# Patient Record
Sex: FEMALE | Race: WHITE | NOT HISPANIC OR LATINO | Employment: FULL TIME | URBAN - METROPOLITAN AREA
[De-identification: names, ages, dates, MRNs, and addresses within clinical notes are randomized per-mention and may not be internally consistent; named-entity substitution may affect disease eponyms.]

---

## 2019-08-09 ENCOUNTER — HOSPITAL ENCOUNTER (OUTPATIENT)
Facility: CLINIC | Age: 34
Discharge: HOME | End: 2019-08-09
Attending: OBSTETRICS & GYNECOLOGY | Admitting: OBSTETRICS & GYNECOLOGY
Payer: COMMERCIAL

## 2019-08-09 VITALS — RESPIRATION RATE: 16 BRPM | SYSTOLIC BLOOD PRESSURE: 104 MMHG | TEMPERATURE: 97.9 F | DIASTOLIC BLOOD PRESSURE: 64 MMHG

## 2019-08-09 PROCEDURE — 59025 FETAL NON-STRESS TEST: CPT

## 2019-08-09 PROCEDURE — 59015 CHORION BIOPSY: CPT

## 2019-08-09 PROCEDURE — G0463 HOSPITAL OUTPT CLINIC VISIT: HCPCS | Mod: 25

## 2019-08-09 RX ORDER — ONDANSETRON 2 MG/ML
4 INJECTION INTRAMUSCULAR; INTRAVENOUS EVERY 6 HOURS PRN
Status: DISCONTINUED | OUTPATIENT
Start: 2019-08-09 | End: 2019-08-09 | Stop reason: HOSPADM

## 2019-08-09 RX ORDER — LEVOTHYROXINE SODIUM 75 UG/1
75 TABLET ORAL DAILY
COMMUNITY
End: 2023-08-30

## 2019-08-09 NOTE — DISCHARGE INSTRUCTIONS
Discharge Instruction for Undelivered Patients      You were seen for: blurred vision  We Consulted: Dr Bates  You had (Test or Medicine):Non stress test     Diet:   Drink 8 to 12 glasses of liquids (milk, juice, water) every day.  You may eat meals and snacks.     Activity:  Count fetal kicks everyday (see handout)  Call your doctor or nurse midwife if your baby is moving less than usual.     Call your provider if you notice:  Swelling in your face or increased swelling in your hands or legs.  Headaches that are not relieved by Tylenol (acetaminophen).  Changes in your vision (blurring: seeing spots or stars.)  Nausea (sick to your stomach) and vomiting (throwing up).   Weight gain of 5 pounds or more per week.  Heartburn that doesn't go away.  Signs of bladder infection: pain when you urinate (use the toilet), need to go more often and more urgently.  The bag of tomlinson (rupture of membranes) breaks, or you notice leaking in your underwear.  Bright red blood in your underwear.  Abdominal (lower belly) or stomach pain.  For first baby: Contractions (tightening) less than 5 minutes apart for one hour or more.  Second (plus) baby: Contractions (tightening) less than 10 minutes apart and getting stronger.  *If less than 34 weeks: Contractions (tightenings) more than 6 times in one hour.  Increase or change in vaginal discharge (note the color and amount)  Other:     Follow-up:  As scheduled in the clinic

## 2019-08-09 NOTE — PLAN OF CARE
Halle is ordinarily seen at [facility], but due to them being closed she came here. Dr Bates is on call MD for today. She reports having an episode of blurred vision once a day for the past few days. She states it is usually in her left eye and lasts for a few minutes then goes away.Denies headache or epigastric distress, and no swelling She has no medical issues. Monitors applied with consent and an NST was obtained per order from Dr Bates.   1415- Discharge patient to home. F/U with regular MD as scheduled in office. Both patient and  verbalize understanding of this plan.

## 2020-02-19 DIAGNOSIS — Z82.79 FAMILY HISTORY OF BICUSPID AORTIC VALVE: Primary | ICD-10-CM

## 2020-02-19 NOTE — PROGRESS NOTES
Date: 2/19/2020    Time of Call: 12:12 PM     Diagnosis:  Family history of BAV     [ TORB ] Ordering provider:  March  Order: echo complete to screen for BAV     Order received by: MARLA Ochoa     Follow-up/additional notes: daughter of Cliff Gonzalezyeni

## 2021-05-15 ENCOUNTER — HEALTH MAINTENANCE LETTER (OUTPATIENT)
Age: 36
End: 2021-05-15

## 2021-09-04 ENCOUNTER — HEALTH MAINTENANCE LETTER (OUTPATIENT)
Age: 36
End: 2021-09-04

## 2022-06-11 ENCOUNTER — HEALTH MAINTENANCE LETTER (OUTPATIENT)
Age: 37
End: 2022-06-11

## 2022-07-22 RX ORDER — PIMECROLIMUS 10 MG/G
CREAM TOPICAL
COMMUNITY
Start: 2022-01-18 | End: 2023-12-11

## 2022-07-22 RX ORDER — KETOCONAZOLE 20 MG/G
CREAM TOPICAL
COMMUNITY
Start: 2022-01-18 | End: 2023-12-11

## 2022-07-22 RX ORDER — TACROLIMUS 1 MG/G
OINTMENT TOPICAL
COMMUNITY
Start: 2021-11-05 | End: 2023-12-11

## 2022-07-22 RX ORDER — LEVOTHYROXINE SODIUM 112 UG/1
TABLET ORAL
COMMUNITY
Start: 2021-05-23

## 2022-08-01 ENCOUNTER — ANCILLARY PROCEDURE (OUTPATIENT)
Dept: GENERAL RADIOLOGY | Facility: CLINIC | Age: 37
End: 2022-08-01
Attending: NURSE PRACTITIONER
Payer: COMMERCIAL

## 2022-08-01 ENCOUNTER — OFFICE VISIT (OUTPATIENT)
Dept: RHEUMATOLOGY | Facility: CLINIC | Age: 37
End: 2022-08-01
Payer: COMMERCIAL

## 2022-08-01 VITALS
OXYGEN SATURATION: 100 % | DIASTOLIC BLOOD PRESSURE: 72 MMHG | TEMPERATURE: 97.4 F | WEIGHT: 136.9 LBS | SYSTOLIC BLOOD PRESSURE: 97 MMHG | HEART RATE: 55 BPM

## 2022-08-01 DIAGNOSIS — M05.79 RHEUMATOID ARTHRITIS INVOLVING MULTIPLE SITES WITH POSITIVE RHEUMATOID FACTOR (H): ICD-10-CM

## 2022-08-01 DIAGNOSIS — M05.79 RHEUMATOID ARTHRITIS INVOLVING MULTIPLE SITES WITH POSITIVE RHEUMATOID FACTOR (H): Primary | ICD-10-CM

## 2022-08-01 DIAGNOSIS — M35.01 SJOGREN'S SYNDROME WITH KERATOCONJUNCTIVITIS SICCA (H): ICD-10-CM

## 2022-08-01 PROBLEM — L80 VITILIGO: Status: ACTIVE | Noted: 2022-02-15

## 2022-08-01 PROBLEM — R76.8 RHEUMATOID FACTOR POSITIVE: Status: ACTIVE | Noted: 2022-07-11

## 2022-08-01 PROCEDURE — 73130 X-RAY EXAM OF HAND: CPT | Mod: GC | Performed by: RADIOLOGY

## 2022-08-01 PROCEDURE — 99204 OFFICE O/P NEW MOD 45 MIN: CPT | Performed by: NURSE PRACTITIONER

## 2022-08-01 RX ORDER — HYDROXYCHLOROQUINE SULFATE 200 MG/1
200 TABLET, FILM COATED ORAL 2 TIMES DAILY
Qty: 60 TABLET | Refills: 3 | Status: SHIPPED | OUTPATIENT
Start: 2022-08-01 | End: 2022-12-28

## 2022-08-01 ASSESSMENT — PAIN SCALES - GENERAL: PAINLEVEL: MODERATE PAIN (4)

## 2022-08-01 NOTE — PATIENT INSTRUCTIONS
1) Hand xrays today    2) Start plaquenil 200 mg tab, 1 tab twice a day    3) Return to see me in 3 months.

## 2022-08-01 NOTE — PROGRESS NOTES
Rheumatology Consult  Shanice Drew CNP      Halle Leonheld  YOB: 1985    Age: 37 year old   MRN# [id number]       Date of Visit: 08/01/2022  Primary care provider: Cindy Mack              Subjective:     Halle is a 37 year old female presents today for consult for early RA, (Onset 2/22, dx 5/22,+RF, +CCP, +MAEGAN).  Current therapy: None.      Today:     HPI: Started having sore hands 2/22 and she would wake up with quite sore hands, then her feet started doing the same thing. Felt like she was walking like her dad in am (who has RA).  Stiffness improves with moving after a couple of hours. Feels like fingers are tight full. No joint redness or warmth. Prior to onset had no illness or stressful event, she had quite BF 1 yr earlier.  Saw rheumatologist in May 2022 at [facility] and dx with early onset RA. Was unsure what to do with the options as she is actively trying to get pregnant.       ROS: Patient denies recent infections, fevers, JOHNSON, weight loss, diarrhea, SOB, mouth sores, new numbness or tingling. Has dry eye the last year and slight dry mouth, wakes up in the middle of the night to drink water. Had rash on abdomin and thought to be viral at the time 5/22. Denies chronic pain. Does get pain after eating avocado, nuts, quiona, veggies. Avoids gluten as camp up on a genetic screen.  LMP 2 weeks ago.         Past Rheum tx:      Social History  No tobacco or ETOH  Works interior design.     FMH:  Strong fmh RA    Active Problem list:  Patient Active Problem List    Diagnosis Date Noted     Indication for care in labor or delivery 08/09/2019     Priority: Medium            Objective:     Physical Exam  Blood Pressure 97/72 (BP Location: Left arm, Patient Position: Sitting, Cuff Size: Adult Regular)   Pulse 55   Temperature 97.4  F (36.3  C) (Oral)   Weight 62.1 kg (136 lb 14.4 oz)   Last Menstrual Period  (LMP Unknown)   Oxygen Saturation 100%   Breastfeeding No    There is no height or weight on file to calculate BMI.    Constitutional: Normal body habitus with out gross deformities. Well groomed.   Psych: nl judgement, orientation, memory, affect.  Eyes: wnl EOM, PERRLA, vision. conjunctiva injected.   ENT: wnl external ears, nose, hearing, lips, teeth, gums, throat. No mucous membrane lesions, normal saliva pool  Neck: no mass, thyroid enlargement, enlarged cervical lymph nodes or parotid glands  Resp: lungs clear to auscultation, nl work of breathing  CV: RRR, no murmurs, rubs or gallops, no edema  GI: +BS, abd non tender  Skin: no nail pitting, alopecia, rash, nodules or lesions of exposed areas of face, neck, bilateral upper and lower extremity   Neuro: Strength WNL of bilateral upper and lower extremity large muscle groups.     MSK- (TTP=tender to palpation, S=swelling, W=warmth, R=redness)  TMJ: -TTP/S/W/R ,FROM   Cervical spine:  FROM  Shoulders: -TTP/S/W/R ,FROM   Elbows: -TTP/S/W/R ,FROM   Wrists: -TTP/S/W/R ,FROM   Hands: R 3rd PIP, L 3rd MCP, L 2nd PIP + mild to trace swelling and TTP.   Hips: -TTP of GT  Knees: -TTP/S/W/R ,FROM    Ankles: -TTP/S/W/R ,FROM  No enthesopathy or tenosynovitis.   Feet: -TTP/S/W/R ,FROM  . No dactylitis. Fullness R 2-3 MTPs.       Labs:    At [facility]   +MAEGAN 1:160 fine dense speckled   +SSA  +CCP,  +RF  +quant gold ?,   C3 78 (low)   DsDNA normal.     Imaging: Chest xray at [facility], WNL.            Assessment and Plan:     1) Early onset RA, with secondary sjogren's (Onset 2/22, dx 5/22,+RF, +CCP, +MAEGAN, +SSA).  Current therapy: None.  Pt with 3 swollen joints on exam today. Is currently attempting to get pregnant and is unsure with starting medication during this time. Discussed + SSA and risk of fetal heart block, strongly recommend pt start plaquenil. Discussed pros and cons of more aggressive therapy. On exam RA appears early and currently mild, will do xray of hands to further evaluate. If xray is with out erosions, ok to move forward with  plaquenil as mono therapy in the setting of attempting pregnancy and +LTBI, if symptoms increase/ flares or has significant erosions on xray would consider imuran vs orencia.   RTC in 3 months.    2) + LTBI, + quant gold.   -Encouraged treatment, pt likely will do after family planning complete    Pt education provided on plaquenil SE, risks and benefits, need for yearly eye exam, as well as RA dz processPt states understanding and agreement to plan of care, has no further questions.     Shanice Drew, CNP  Rheumatology,  Health

## 2022-08-06 ENCOUNTER — TELEPHONE (OUTPATIENT)
Dept: RHEUMATOLOGY | Facility: CLINIC | Age: 37
End: 2022-08-06

## 2022-08-06 ENCOUNTER — NURSE TRIAGE (OUTPATIENT)
Dept: NURSING | Facility: CLINIC | Age: 37
End: 2022-08-06

## 2022-08-07 NOTE — CONFIDENTIAL NOTE
Telephone encounter:      Paged regarding patient with reaction after hydroxychloroquine.  Patient reports she took hydroxychloroquine for the first time this morning again this evening.  About an hour after taking the dose this evening she reports she had blurry vision that lasted about a half an hour, with associated headache.  She notes ongoing but improved headache and resolved blurry vision.  Patient reports history of migraines with aura.  Notes that headache and vision changes were similar to previous migraines with aura however rather than being in her peripheral vision was across her central field of vision.  She denies tingling, numbness or weakness in her extremities.    Discussed with patient that hydroxychloroquine can cause ophthalmologic issues with chronic use but that these are unlikely to occur acutely and that reported episode is unlikely related to hydroxychloroquine.  Recommended that she alert clinic should symptoms recur.    Ritika Mack MD  Rheumatology Fellow  8/6/2022

## 2022-08-07 NOTE — TELEPHONE ENCOUNTER
PCP: [facility]: Dr Cindy Mack     Prescribed Hydroxychloroquine earlier this week. She started taking it today and took it twice. She began getting blurry vision. It says on the pharmacy insert to call if blurry vision. Her vision is better now.   Rheumatologist Shanice Drew NP @ [facility] prescribed the medication.   Triaged to a disposition of Go to ED now or PCP triage. Advised to call Rheumatologist oncall now, if unable to reach, then she will either call oncall PCP now or go to the ER.     Krystin Bledsoe RN Triage Nurse Advisor 9:28 PM 8/6/2022    Reason for Disposition    [1] Blurred vision or visual changes AND [2] present now AND [3] sudden onset or new (e.g., minutes, hours, days)  (Exception: seeing floaters / black specks OR previously diagnosed migraine headaches with same symptoms)    Additional Information    Negative: Weakness of the face, arm or leg on one side of the body    Negative: Followed getting substance in the eye    Negative: Foreign body or object is or was lodged in the eye    Negative: Followed an eye injury    Negative: Followed sun lamp or sun exposure (UV keratitis)    Negative: Yellow or green discharge (pus) in the eye    Negative: Pregnant    Negative: Postpartum (from 0 to 6 weeks after delivery)    Negative: Complete loss of vision in 1 or both eyes    Negative: SEVERE eye pain    Negative: SEVERE headache    Negative: Double vision    Protocols used: VISION LOSS OR CHANGE-A-AH

## 2022-08-24 ENCOUNTER — TRANSFERRED RECORDS (OUTPATIENT)
Dept: HEALTH INFORMATION MANAGEMENT | Facility: CLINIC | Age: 37
End: 2022-08-24

## 2022-09-22 ENCOUNTER — TELEPHONE (OUTPATIENT)
Dept: RHEUMATOLOGY | Facility: CLINIC | Age: 37
End: 2022-09-22

## 2022-09-22 NOTE — TELEPHONE ENCOUNTER
Plaquenil eye exam abstracted into flow sheets. Office notes to be scanned into chart.      JASON Rudolph  Rheumatology/Infectious disease  [facility]   [phone]

## 2022-10-22 ENCOUNTER — HEALTH MAINTENANCE LETTER (OUTPATIENT)
Age: 37
End: 2022-10-22

## 2022-12-28 ENCOUNTER — OFFICE VISIT (OUTPATIENT)
Dept: RHEUMATOLOGY | Facility: CLINIC | Age: 37
End: 2022-12-28
Attending: NURSE PRACTITIONER
Payer: COMMERCIAL

## 2022-12-28 ENCOUNTER — LAB (OUTPATIENT)
Dept: LAB | Facility: CLINIC | Age: 37
End: 2022-12-28
Attending: NURSE PRACTITIONER
Payer: COMMERCIAL

## 2022-12-28 VITALS
WEIGHT: 138.5 LBS | BODY MASS INDEX: 21.74 KG/M2 | HEART RATE: 90 BPM | DIASTOLIC BLOOD PRESSURE: 70 MMHG | OXYGEN SATURATION: 97 % | TEMPERATURE: 98.6 F | HEIGHT: 67 IN | SYSTOLIC BLOOD PRESSURE: 101 MMHG

## 2022-12-28 DIAGNOSIS — R76.8 POSITIVE ANA (ANTINUCLEAR ANTIBODY): ICD-10-CM

## 2022-12-28 DIAGNOSIS — M05.79 RHEUMATOID ARTHRITIS INVOLVING MULTIPLE SITES WITH POSITIVE RHEUMATOID FACTOR (H): ICD-10-CM

## 2022-12-28 DIAGNOSIS — M35.01 SJOGREN'S SYNDROME WITH KERATOCONJUNCTIVITIS SICCA (H): ICD-10-CM

## 2022-12-28 DIAGNOSIS — R59.9 ADENOPATHY: ICD-10-CM

## 2022-12-28 DIAGNOSIS — M05.79 RHEUMATOID ARTHRITIS INVOLVING MULTIPLE SITES WITH POSITIVE RHEUMATOID FACTOR (H): Primary | ICD-10-CM

## 2022-12-28 DIAGNOSIS — E55.9 VITAMIN D DEFICIENCY: ICD-10-CM

## 2022-12-28 LAB
ALBUMIN SERPL-MCNC: 4 G/DL (ref 3.4–5)
ALBUMIN UR-MCNC: NEGATIVE MG/DL
ALT SERPL W P-5'-P-CCNC: 16 U/L (ref 0–50)
APPEARANCE UR: CLEAR
AST SERPL W P-5'-P-CCNC: 13 U/L (ref 0–45)
BASOPHILS # BLD AUTO: 0.1 10E3/UL (ref 0–0.2)
BASOPHILS NFR BLD AUTO: 1 %
BILIRUB UR QL STRIP: NEGATIVE
COLOR UR AUTO: NORMAL
CREAT SERPL-MCNC: 0.81 MG/DL (ref 0.52–1.04)
CRP SERPL-MCNC: <2.9 MG/L (ref 0–8)
DEPRECATED CALCIDIOL+CALCIFEROL SERPL-MC: 32 UG/L (ref 20–75)
EOSINOPHIL # BLD AUTO: 1.1 10E3/UL (ref 0–0.7)
EOSINOPHIL NFR BLD AUTO: 11 %
ERYTHROCYTE [DISTWIDTH] IN BLOOD BY AUTOMATED COUNT: 12.3 % (ref 10–15)
ERYTHROCYTE [SEDIMENTATION RATE] IN BLOOD BY WESTERGREN METHOD: 5 MM/HR (ref 0–20)
GFR SERPL CREATININE-BSD FRML MDRD: >90 ML/MIN/1.73M2
GLUCOSE UR STRIP-MCNC: NEGATIVE MG/DL
HCT VFR BLD AUTO: 43.2 % (ref 35–47)
HGB BLD-MCNC: 14.8 G/DL (ref 11.7–15.7)
HGB UR QL STRIP: NEGATIVE
IMM GRANULOCYTES # BLD: 0 10E3/UL
IMM GRANULOCYTES NFR BLD: 0 %
KETONES UR STRIP-MCNC: NEGATIVE MG/DL
LEUKOCYTE ESTERASE UR QL STRIP: NEGATIVE
LYMPHOCYTES # BLD AUTO: 1.7 10E3/UL (ref 0.8–5.3)
LYMPHOCYTES NFR BLD AUTO: 16 %
Lab: NORMAL
MCH RBC QN AUTO: 29.8 PG (ref 26.5–33)
MCHC RBC AUTO-ENTMCNC: 34.3 G/DL (ref 31.5–36.5)
MCV RBC AUTO: 87 FL (ref 78–100)
MONOCYTES # BLD AUTO: 0.8 10E3/UL (ref 0–1.3)
MONOCYTES NFR BLD AUTO: 8 %
NEUTROPHILS # BLD AUTO: 6.7 10E3/UL (ref 1.6–8.3)
NEUTROPHILS NFR BLD AUTO: 64 %
NITRATE UR QL: NEGATIVE
NRBC # BLD AUTO: 0 10E3/UL
NRBC BLD AUTO-RTO: 0 /100
PERFORMING LABORATORY: NORMAL
PH UR STRIP: 6.5 [PH] (ref 5–7)
PLATELET # BLD AUTO: 166 10E3/UL (ref 150–450)
RBC # BLD AUTO: 4.97 10E6/UL (ref 3.8–5.2)
RBC URINE: 0 /HPF
SP GR UR STRIP: 1 (ref 1–1.03)
SPECIMEN STATUS: NORMAL
TEST NAME: NORMAL
TOTAL PROTEIN SERUM FOR ELP: 7.2 G/DL (ref 6.4–8.3)
UROBILINOGEN UR STRIP-MCNC: NORMAL MG/DL
WBC # BLD AUTO: 10.4 10E3/UL (ref 4–11)
WBC URINE: <1 /HPF

## 2022-12-28 PROCEDURE — 82306 VITAMIN D 25 HYDROXY: CPT

## 2022-12-28 PROCEDURE — 36415 COLL VENOUS BLD VENIPUNCTURE: CPT

## 2022-12-28 PROCEDURE — 84165 PROTEIN E-PHORESIS SERUM: CPT | Mod: 26

## 2022-12-28 PROCEDURE — 86147 CARDIOLIPIN ANTIBODY EA IG: CPT

## 2022-12-28 PROCEDURE — G0463 HOSPITAL OUTPT CLINIC VISIT: HCPCS

## 2022-12-28 PROCEDURE — 84155 ASSAY OF PROTEIN SERUM: CPT

## 2022-12-28 PROCEDURE — 84460 ALANINE AMINO (ALT) (SGPT): CPT

## 2022-12-28 PROCEDURE — 86146 BETA-2 GLYCOPROTEIN ANTIBODY: CPT

## 2022-12-28 PROCEDURE — 86160 COMPLEMENT ANTIGEN: CPT

## 2022-12-28 PROCEDURE — 84999 UNLISTED CHEMISTRY PROCEDURE: CPT

## 2022-12-28 PROCEDURE — 99214 OFFICE O/P EST MOD 30 MIN: CPT | Performed by: NURSE PRACTITIONER

## 2022-12-28 PROCEDURE — 86140 C-REACTIVE PROTEIN: CPT

## 2022-12-28 PROCEDURE — 85025 COMPLETE CBC W/AUTO DIFF WBC: CPT | Performed by: NURSE PRACTITIONER

## 2022-12-28 PROCEDURE — 86225 DNA ANTIBODY NATIVE: CPT

## 2022-12-28 PROCEDURE — 84165 PROTEIN E-PHORESIS SERUM: CPT | Mod: TC | Performed by: PATHOLOGY

## 2022-12-28 PROCEDURE — 85730 THROMBOPLASTIN TIME PARTIAL: CPT

## 2022-12-28 PROCEDURE — 86256 FLUORESCENT ANTIBODY TITER: CPT

## 2022-12-28 PROCEDURE — 85390 FIBRINOLYSINS SCREEN I&R: CPT | Mod: 26 | Performed by: PATHOLOGY

## 2022-12-28 PROCEDURE — 81001 URINALYSIS AUTO W/SCOPE: CPT

## 2022-12-28 PROCEDURE — 82040 ASSAY OF SERUM ALBUMIN: CPT

## 2022-12-28 PROCEDURE — 82565 ASSAY OF CREATININE: CPT

## 2022-12-28 PROCEDURE — 84450 TRANSFERASE (AST) (SGOT): CPT

## 2022-12-28 PROCEDURE — 85652 RBC SED RATE AUTOMATED: CPT

## 2022-12-28 RX ORDER — HYDROXYCHLOROQUINE SULFATE 200 MG/1
200 TABLET, FILM COATED ORAL DAILY
Qty: 90 TABLET | Refills: 3 | Status: SHIPPED | OUTPATIENT
Start: 2022-12-28 | End: 2023-08-30

## 2022-12-28 ASSESSMENT — PAIN SCALES - GENERAL: PAINLEVEL: MILD PAIN (2)

## 2022-12-28 NOTE — NURSING NOTE
"Chief Complaint   Patient presents with     RECHECK     Follow-up     /70   Pulse 90   Temp 98.6  F (37  C)   Ht 1.702 m (5' 7\")   Wt 62.8 kg (138 lb 8 oz)   SpO2 97%   BMI 21.69 kg/m      Obdulia Coppola on 12/28/2022 at 9:54 AM    "

## 2022-12-28 NOTE — LETTER
12/28/2022       RE: Halle Alcantara  [address]  [facility] [address]     Dear Colleague,    Thank you for referring your patient, Halle Alcantara, to the [facility] RHEUMATOLOGY at [facility]. Please see a copy of my visit note below.        Rheumatology Consult  Shanice Drew CNP      Halle Alcantara  YOB: 1985    Age: 37 year old   MRN# [id number]       Date of Visit: 12/28/2022  Primary care provider: Cindy Mack              Subjective:     Halle is a 37 year old female presents today for consult for early RA, (Onset 2/22, dx 5/22,+RF, +CCP, +MAEGAN, + SSA, low c4).  Current therapy: None.      Today:   Tried plaquenil 200 mg BID for a month, felt blurry vision and light headed. Stopped taking it, the symptoms went away. During that period joint pain improved, then worsened with stopping plaquenil.  On plaquenil was super hard to work. Now last month or two joint symptoms are back. Stiff for an hour. Had a cold begnining of Dec, L lymph node has stayed swollen since.     HPI: Started having sore hands 2/22 and she would wake up with quite sore hands, then her feet started doing the same thing. Felt like she was walking like her dad in am (who has RA).  Stiffness improves with moving after a couple of hours. Feels like fingers are tight full. No joint redness or warmth. Prior to onset had no illness or stressful event, she had quite BF 1 yr earlier.  Saw rheumatologist in May 2022 at [facility] and dx with early onset RA. Was unsure what to do with the options as she is actively trying to get pregnant.       ROS: Patient denies recent infections, fevers, JOHNSON, weight loss, diarrhea, SOB, mouth sores, new numbness or tingling. Has dry eye the last year and slight dry mouth, wakes up in the middle of the night to drink water. Had rash on abdomin and thought to be viral at the time 5/22. Denies  "chronic pain. Does get pain after eating avocado, nuts, quiona, veggies. Avoids gluten as camp up on a genetic screen.  LMP 2 weeks ago.         Past Rheum tx:      Social History  No tobacco or ETOH  Works interior design.     FMH:  Strong fmh RA    Active Problem list:  Patient Active Problem List    Diagnosis Date Noted     Rheumatoid factor positive 07/11/2022     Priority: Medium     Vitiligo 02/15/2022     Priority: Medium     Indication for care in labor or delivery 08/09/2019     Priority: Medium     Iron deficiency anemia 04/23/2016     Priority: Medium     Depressive disorder, not elsewhere classified 04/23/2016     Priority: Medium     Hypothyroidism 07/26/2005     Priority: Medium     Formatting of this note might be different from the original.  Subacute thyroiditis  2005 then became hypothyroid.  Formatting of this note might be different from the original.  Formatting of this note might be different from the original.  Subacute thyroiditis  2005 then became hypothyroid.              Objective:     Physical Exam  Blood Pressure 101/70   Pulse 90   Temperature 98.6  F (37  C)   Height 1.702 m (5' 7\")   Weight 62.8 kg (138 lb 8 oz)   Oxygen Saturation 97%   Body Mass Index 21.69 kg/m    Body mass index is 21.69 kg/m .    Constitutional: Normal body habitus with out gross deformities. Well groomed.   Psych: nl judgement, orientation, memory, affect.  Eyes: wnl EOM, PERRLA, vision. conjunctiva injected.     Neck: L swollen bulky lymph node cervical     Skin: no nail pitting, alopecia, rash, nodules or lesions of exposed areas of face, neck, bilateral upper and lower extremity   Neuro: Strength WNL of bilateral upper and lower extremity large muscle groups.     MSK- (TTP=tender to palpation, S=swelling, W=warmth, R=redness)    Wrists: -TTP/S/W/R ,FROM   Hands: R 3rd PIP mild to trace swelling and TTP.     Knees: -TTP/S/W/R ,FROM        Labs:    At [facility]   +MAEGAN 1:160 fine dense speckled "   +SSA  +CCP,  +RF  +quant gold ?,   C3 78 (low)   DsDNA normal.   VITAMIN D TOTAL   Component 04/18/2016       VITAMIN D TOTAL 16.4 Low             Imaging: Chest xray at [facility], WNL.   3 views bilateral hand radiographs 8/1/2022 4:13 PM                                                                    Impression:  1. No acute osseous abnormality.  2. No substantial degenerative change.              Assessment and Plan:     1) Early onset RA, with secondary sjogren's (Onset 2/22, dx 5/22,+RF, +CCP, +MAEGAN, +SSA, low c4) possible lupus.  Current therapy: None.  Is currently contemplating/ attempting to get pregnant and is unsure with starting medication during this time. Discussed + SSA and risk of fetal heart block, strongly recommend pt start plaquenil. Discussed pros and cons of more aggressive therapy. On exam RA appears early and currently mild, xray is with out erosions, ok to move forward with plaquenil as mono therapy in the setting of attempting pregnancy and +LTBI, if symptoms increase/ flares consider imuran vs orencia. Given low c4 will follow for development of lupus. Given Sjogren's and swelling lymph node will do SPEP, although likely lymphadenopathy is from URI 4 weeks ago. Recommend vit d at 45 or above and omega 3 replacement.   RTC in 3 months.    2) + LTBI, + quant gold.   -Encouraged treatment, pt likely will do after family planning complete    Pt instructions:    1) Plaquenil 200 mg, work up to goal dose of 1 pill daily.  Send me a [email or web address] message in one month and tell me how much you are taking. If we aren't able to get you on a steady dose we will have to move on to another medicine.     2) If your lymph node doesn't improve by 2 weeks please see your primary, at any point more start to swell please see your primary     3) See me back next available     4) Labs today    Pt education provided on plaquenil SE, risks and benefits, need for yearly eye exam, as well as RA dz processPt states  understanding and agreement to plan of care, has no further questions.     Shanice Drew, CNP  Rheumatology,  Health

## 2022-12-28 NOTE — PATIENT INSTRUCTIONS
1) Plaquenil 200 mg, work up to goal dose of 1 pill daily.  Send me a [email or web address] message in one month and tell me how much you are taking. If we aren't able to get you on a steady dose we will have to move on to another medicine.     2) If your lymph node doesn't improve by 2 weeks please see your primary, at any point more start to swell please see your primary     3) See me back next available     4) Labs today

## 2022-12-28 NOTE — PROGRESS NOTES
Rheumatology Consult  Shanice Drew CNP      Halle Leonheld  YOB: 1985    Age: 37 year old   MRN# [id number]       Date of Visit: 12/28/2022  Primary care provider: Cindy Mack              Subjective:     Halle is a 37 year old female presents today for consult for early RA, (Onset 2/22, dx 5/22,+RF, +CCP, +MAEGAN, + SSA, low c4).  Current therapy: None.      Today:   Tried plaquenil 200 mg BID for a month, felt blurry vision and light headed. Stopped taking it, the symptoms went away. During that period joint pain improved, then worsened with stopping plaquenil.  On plaquenil was super hard to work. Now last month or two joint symptoms are back. Stiff for an hour. Had a cold begnining of Dec, L lymph node has stayed swollen since.     HPI: Started having sore hands 2/22 and she would wake up with quite sore hands, then her feet started doing the same thing. Felt like she was walking like her dad in am (who has RA).  Stiffness improves with moving after a couple of hours. Feels like fingers are tight full. No joint redness or warmth. Prior to onset had no illness or stressful event, she had quite BF 1 yr earlier.  Saw rheumatologist in May 2022 at [facility] and dx with early onset RA. Was unsure what to do with the options as she is actively trying to get pregnant.       ROS: Patient denies recent infections, fevers, JOHNSON, weight loss, diarrhea, SOB, mouth sores, new numbness or tingling. Has dry eye the last year and slight dry mouth, wakes up in the middle of the night to drink water. Had rash on abdomin and thought to be viral at the time 5/22. Denies chronic pain. Does get pain after eating avocado, nuts, quiona, veggies. Avoids gluten as camp up on a genetic screen.  LMP 2 weeks ago.         Past Rheum tx:      Social History  No tobacco or ETOH  Works interior design.     FMH:  Strong fmh RA    Active Problem list:  Patient Active Problem List    Diagnosis Date Noted      "Rheumatoid factor positive 07/11/2022     Priority: Medium     Vitiligo 02/15/2022     Priority: Medium     Indication for care in labor or delivery 08/09/2019     Priority: Medium     Iron deficiency anemia 04/23/2016     Priority: Medium     Depressive disorder, not elsewhere classified 04/23/2016     Priority: Medium     Hypothyroidism 07/26/2005     Priority: Medium     Formatting of this note might be different from the original.  Subacute thyroiditis  2005 then became hypothyroid.  Formatting of this note might be different from the original.  Formatting of this note might be different from the original.  Subacute thyroiditis  2005 then became hypothyroid.              Objective:     Physical Exam  Blood Pressure 101/70   Pulse 90   Temperature 98.6  F (37  C)   Height 1.702 m (5' 7\")   Weight 62.8 kg (138 lb 8 oz)   Oxygen Saturation 97%   Body Mass Index 21.69 kg/m    Body mass index is 21.69 kg/m .    Constitutional: Normal body habitus with out gross deformities. Well groomed.   Psych: nl judgement, orientation, memory, affect.  Eyes: wnl EOM, PERRLA, vision. conjunctiva injected.     Neck: L swollen bulky lymph node cervical     Skin: no nail pitting, alopecia, rash, nodules or lesions of exposed areas of face, neck, bilateral upper and lower extremity   Neuro: Strength WNL of bilateral upper and lower extremity large muscle groups.     MSK- (TTP=tender to palpation, S=swelling, W=warmth, R=redness)    Wrists: -TTP/S/W/R ,FROM   Hands: R 3rd PIP mild to trace swelling and TTP.     Knees: -TTP/S/W/R ,FROM        Labs:    At [facility]   +MAEGAN 1:160 fine dense speckled   +SSA  +CCP,  +RF  +quant gold ?,   C3 78 (low)   DsDNA normal.   VITAMIN D TOTAL   Component 04/18/2016       VITAMIN D TOTAL 16.4 Low             Imaging: Chest xray at [facility], WNL.   3 views bilateral hand radiographs 8/1/2022 4:13 PM                                                                    Impression:  1. No acute osseous " abnormality.  2. No substantial degenerative change.              Assessment and Plan:     1) Early onset RA, with secondary sjogren's (Onset 2/22, dx 5/22,+RF, +CCP, +MAEGNA, +SSA, low c4) possible lupus.  Current therapy: None.  Is currently contemplating/ attempting to get pregnant and is unsure with starting medication during this time. Discussed + SSA and risk of fetal heart block, strongly recommend pt start plaquenil. Discussed pros and cons of more aggressive therapy. On exam RA appears early and currently mild, xray is with out erosions, ok to move forward with plaquenil as mono therapy in the setting of attempting pregnancy and +LTBI, if symptoms increase/ flares consider imuran vs orencia. Given low c4 will follow for development of lupus. Given Sjogren's and swelling lymph node will do SPEP, although likely lymphadenopathy is from URI 4 weeks ago. Recommend vit d at 45 or above and omega 3 replacement.   RTC in 3 months.    2) + LTBI, + quant gold.   -Encouraged treatment, pt likely will do after family planning complete    Pt instructions:    1) Plaquenil 200 mg, work up to goal dose of 1 pill daily.  Send me a [employer] message in one month and tell me how much you are taking. If we aren't able to get you on a steady dose we will have to move on to another medicine.     2) If your lymph node doesn't improve by 2 weeks please see your primary, at any point more start to swell please see your primary     3) See me back next available     4) Labs today    Pt education provided on plaquenil SE, risks and benefits, need for yearly eye exam, as well as RA dz processPt states understanding and agreement to plan of care, has no further questions.     Shanice Drew, CNP  Rheumatology, [facility]

## 2022-12-29 ENCOUNTER — OFFICE VISIT (OUTPATIENT)
Dept: URGENT CARE | Facility: URGENT CARE | Age: 37
End: 2022-12-29
Payer: COMMERCIAL

## 2022-12-29 VITALS
TEMPERATURE: 98.1 F | DIASTOLIC BLOOD PRESSURE: 64 MMHG | HEART RATE: 67 BPM | OXYGEN SATURATION: 98 % | SYSTOLIC BLOOD PRESSURE: 96 MMHG

## 2022-12-29 DIAGNOSIS — J02.0 STREP THROAT: ICD-10-CM

## 2022-12-29 DIAGNOSIS — R07.0 THROAT PAIN: Primary | ICD-10-CM

## 2022-12-29 DIAGNOSIS — R59.9 ENLARGED LYMPH NODE: ICD-10-CM

## 2022-12-29 LAB
ALBUMIN SERPL ELPH-MCNC: 4.6 G/DL (ref 3.7–5.1)
ALPHA1 GLOB SERPL ELPH-MCNC: 0.2 G/DL (ref 0.2–0.4)
ALPHA2 GLOB SERPL ELPH-MCNC: 0.5 G/DL (ref 0.5–0.9)
B-GLOBULIN SERPL ELPH-MCNC: 0.6 G/DL (ref 0.6–1)
C3 SERPL-MCNC: 91 MG/DL (ref 81–157)
C4 SERPL-MCNC: 21 MG/DL (ref 13–39)
DEPRECATED S PYO AG THROAT QL EIA: POSITIVE
DRVVT SCREEN RATIO: 0.8
GAMMA GLOB SERPL ELPH-MCNC: 1.3 G/DL (ref 0.7–1.6)
INR PPP: 1.08 (ref 0.85–1.15)
LA PPP-IMP: NEGATIVE
LUPUS INTERPRETATION: NORMAL
M PROTEIN SERPL ELPH-MCNC: 0 G/DL
PROT PATTERN SERPL ELPH-IMP: NORMAL
PTT RATIO: 1.08
THROMBIN TIME: 17.1 SECONDS (ref 13–19)

## 2022-12-29 PROCEDURE — 87880 STREP A ASSAY W/OPTIC: CPT | Performed by: FAMILY MEDICINE

## 2022-12-29 PROCEDURE — 99203 OFFICE O/P NEW LOW 30 MIN: CPT | Performed by: FAMILY MEDICINE

## 2022-12-29 RX ORDER — AMOXICILLIN 500 MG/1
1000 CAPSULE ORAL DAILY
Qty: 20 CAPSULE | Refills: 0 | Status: SHIPPED | OUTPATIENT
Start: 2022-12-29 | End: 2023-01-08

## 2022-12-29 NOTE — PROGRESS NOTES
Assessment & Plan     Throat pain  - Streptococcus A Rapid Screen w/Reflex to PCR - Clinic Collect    Strep throat  Amoxicillin prescribed 1 gram daily for 10 days. No evidence of abscess on exam. Discussed possibly her 3 year old who is asymptomatic may be a carrier if strep reoccurs consider testing child    Enlarged lymph node     Has an annual exam setup with primary care in one week can f/u at that time, may need to consider ultrasound if this persists    See AVS summary for additional recommendations reviewed with patient during this visit.       Roberto Beck MD   [facility]      Owen Gomez is a 37 year old female who presents to clinic today for the following health issues:  Chief Complaint   Patient presents with     Urgent Care     Pharyngitis     Lymph node swollen on left side of neck [id number], Sore throat started yesterday morning. Tested negative for covid today.     HPI  Patient is here for evaluation of sore throat was started in the last 24 hours she does not have a cough or congestion at this present time.  No symptoms of a fever.  She did a COVID test this morning which returned negative.  She has not had COVID in the last 6 months.  Her significant other is not sick.    She also like to note that the left side of her neck in the area of her lymph nodes that she noticed swelling that occurred abruptly about 3 weeks ago and is nontender -this has not increased or decreased in size since present.  There is no restriction in her movement of her neck.    Has 3 year old child at home    Works as an     Patient Active Problem List    Diagnosis Date Noted     Rheumatoid factor positive 07/11/2022     Priority: Medium     Vitiligo 02/15/2022     Priority: Medium     Indication for care in labor or delivery 08/09/2019     Priority: Medium     Iron deficiency anemia 04/23/2016     Priority: Medium     Depressive disorder, not elsewhere classified 04/23/2016      Priority: Medium     Hypothyroidism 07/26/2005     Priority: Medium     Formatting of this note might be different from the original.  Subacute thyroiditis  2005 then became hypothyroid.  Formatting of this note might be different from the original.  Formatting of this note might be different from the original.  Subacute thyroiditis  2005 then became hypothyroid.         Current Outpatient Medications   Medication     hydroxychloroquine (PLAQUENIL) 200 MG tablet     ketoconazole (NIZORAL) 2 % external cream     levothyroxine (SYNTHROID/LEVOTHROID) 112 MCG tablet     levothyroxine (SYNTHROID/LEVOTHROID) 75 MCG tablet     pimecrolimus (ELIDEL) 1 % external cream     Prenatal MV-Min-Fe Fum-FA-DHA (PRENATAL MULTIVITAMIN + DHA PO)     tacrolimus (PROTOPIC) 0.1 % external ointment     No current facility-administered medications for this visit.         Objective    BP 96/64   Pulse 67   Temp 98.1  F (36.7  C) (Oral)   SpO2 98%   Physical Exam   GEN: nad  Throat: no trismus, no enlarged tonsils or exudates, uvula midline.   Head: no obvious swelling around area of parotid or submandibular gland  Neck: area of swollen lymph nodes overlying region of Left ant cervical chain, full ROM  CV: RRR no m/r/g  Pulm: clear bilaterally    Results for orders placed or performed in visit on 12/29/22   Streptococcus A Rapid Screen w/Reflex to PCR - Clinic Collect     Status: Abnormal    Specimen: Throat; Swab   Result Value Ref Range    Group A Strep antigen Positive (A) Negative           The use of Dragon/CrayonPixel dictation services may have been used to construct the content in this note; any grammatical or spelling errors are non-intentional. Please contact the author of this note directly if you are in need of any clarification.

## 2022-12-29 NOTE — PATIENT INSTRUCTIONS
Antibiotic: Amoxicillin 1000mg once a day for 10 days     Throw out toothbrush and replace in five days     Symptoms may not be significantly better for 48-72 hours     Tylenol and/or ibuprofen as needed for discomfort every 6 hours      Stay hydrated: about 5-6 glasses of water a day      --  Follow-up with your Doctor regarding the enlarged lymph node of your neck

## 2022-12-30 LAB
B2 GLYCOPROT1 IGA SER-ACNC: 0.9 U/ML
B2 GLYCOPROT1 IGG SERPL IA-ACNC: 1.6 U/ML
B2 GLYCOPROT1 IGM SERPL IA-ACNC: <2.4 U/ML
CARDIOLIPIN IGA SER IA-ACNC: 1.2 APL-U/ML
CARDIOLIPIN IGA SER IA-ACNC: NEGATIVE
DSDNA AB SER-ACNC: 4.3 IU/ML
MISCELLANEOUS TEST 1 (ARUP): NORMAL

## 2023-04-12 ENCOUNTER — OFFICE VISIT (OUTPATIENT)
Dept: RHEUMATOLOGY | Facility: CLINIC | Age: 38
End: 2023-04-12
Attending: NURSE PRACTITIONER
Payer: COMMERCIAL

## 2023-04-12 VITALS
TEMPERATURE: 98.8 F | WEIGHT: 133.8 LBS | SYSTOLIC BLOOD PRESSURE: 96 MMHG | OXYGEN SATURATION: 99 % | HEART RATE: 66 BPM | BODY MASS INDEX: 21 KG/M2 | HEIGHT: 67 IN | DIASTOLIC BLOOD PRESSURE: 63 MMHG

## 2023-04-12 DIAGNOSIS — M05.79 RHEUMATOID ARTHRITIS INVOLVING MULTIPLE SITES WITH POSITIVE RHEUMATOID FACTOR (H): ICD-10-CM

## 2023-04-12 DIAGNOSIS — L80 VITILIGO: Primary | ICD-10-CM

## 2023-04-12 PROCEDURE — 99214 OFFICE O/P EST MOD 30 MIN: CPT | Performed by: NURSE PRACTITIONER

## 2023-04-12 PROCEDURE — G0463 HOSPITAL OUTPT CLINIC VISIT: HCPCS | Performed by: NURSE PRACTITIONER

## 2023-04-12 RX ORDER — TRIAMCINOLONE ACETONIDE 1 MG/G
CREAM TOPICAL 2 TIMES DAILY
Qty: 100 G | Refills: 0 | Status: SHIPPED | OUTPATIENT
Start: 2023-04-12 | End: 2023-12-11

## 2023-04-12 ASSESSMENT — PAIN SCALES - GENERAL: PAINLEVEL: NO PAIN (0)

## 2023-04-12 NOTE — NURSING NOTE
"Chief Complaint   Patient presents with     RECHECK     Follow-up     BP 96/63   Pulse 66   Temp 98.8  F (37.1  C) (Oral)   Ht 1.702 m (5' 7.01\")   Wt 60.7 kg (133 lb 12.8 oz)   SpO2 99%   BMI 20.95 kg/m      Obdulia Coppola on 4/12/2023 at 10:05 AM    "

## 2023-04-12 NOTE — LETTER
2023       RE: Halle Alcantara  [address]  [facility] [address]     Dear Colleague,    Thank you for referring your patient, Halle Alcantara, to the [facility] RHEUMATOLOGY at [facility]. Please see a copy of my visit note below.        Rheumatology Consult  Shanice Drew CNP      Halle Alcantara  YOB: 1985    Age: 37 year old   MRN# [id number]       Date of Visit: 2023  Primary care provider: Cindy Mack              Subjective:     Halle is a 37 year old female presents today for follow-up for early RA, (Onset , dx ,+RF, +CCP, +MAEGAN, + SSA, low c4) with possible SLE.  Current therapy: Plaquenil 200 mg 1 tab 3 times a week.  +quant gold/LTBI.  , currently 8 wks pg MARK 23     Today:   Retried plaquenil and can not take more than 3 tabs a week or her SE of blurred vision, feeling, dizzy and over all yuck feeling return. Is 8 weeks pregnant.  Had MRI on L neck lymph node, believed to be benign but seeing ENT for Follow-up.   Joints: Doing good. Rare pain of knuckles.   Initial symptoms of RA developed a few months after stopping breast feeding, vitiligo happened first.       HPI: Started having sore hands  and she would wake up with quite sore hands, then her feet started doing the same thing. Felt like she was walking like her dad in am (who has RA).  Stiffness improves with moving after a couple of hours. Feels like fingers are tight full. No joint redness or warmth. Prior to onset had no illness or stressful event, she had quite BF 1 yr earlier.  Saw rheumatologist in May 2022 at [facility] and dx with early onset RA. Was unsure what to do with the options as she is actively trying to get pregnant.       ROS: Patient denies recent infections, fevers, JOHNSON, weight loss, diarrhea, SOB, mouth sores, new numbness or tingling. Has dry eye the last year and slight dry  "mouth, wakes up in the middle of the night to drink water. Had rash on abdomin and thought to be viral at the time 5/22. Denies chronic pain. Does get pain after eating avocado, nuts, quiona, veggies. Avoids gluten as camp up on a genetic screen.  LMP 2 weeks ago.         Past Rheum tx:      Social History  No tobacco or ETOH  Works interior design.     FMH:  Strong fmh RA    Active Problem list:  Patient Active Problem List    Diagnosis Date Noted    Rheumatoid factor positive 07/11/2022     Priority: Medium    Vitiligo 02/15/2022     Priority: Medium    Indication for care in labor or delivery 08/09/2019     Priority: Medium    Iron deficiency anemia 04/23/2016     Priority: Medium    Depressive disorder, not elsewhere classified 04/23/2016     Priority: Medium    Hypothyroidism 07/26/2005     Priority: Medium     Formatting of this note might be different from the original.  Subacute thyroiditis  2005 then became hypothyroid.  Formatting of this note might be different from the original.  Formatting of this note might be different from the original.  Subacute thyroiditis  2005 then became hypothyroid.              Objective:     Physical Exam  Blood Pressure 96/63   Pulse 66   Temperature 98.8  F (37.1  C) (Oral)   Height 1.702 m (5' 7.01\")   Weight 60.7 kg (133 lb 12.8 oz)   Oxygen Saturation 99%   Body Mass Index 20.95 kg/m    Body mass index is 20.95 kg/m .    Constitutional: Normal body habitus with out gross deformities. Well groomed.   Psych: nl judgement, orientation, memory, affect.  Eyes: wnl EOM, PERRLA, vision. conjunctiva injected.       Skin: no nail pitting, alopecia, rash, nodules or lesions of exposed areas of face, neck, bilateral upper and lower extremity   Neuro: Strength WNL of bilateral upper and lower extremity large muscle groups.     MSK- (TTP=tender to palpation, S=swelling, W=warmth, R=redness)  Shoulders, elbows, knees, ankles, feet _T/S  Wrists: -TTP/S/W/R ,FROM   Hands: R 3rd " PIP mild +T/S. Bony prominence of 2-4 PIPs         Labs:    At [facility]   +MAEGAN 1:160 fine dense speckled   +SSA  +CCP,  +RF  +quant gold ?,   C3 78 (low)   DsDNA normal.    Latest Reference Range & Units 12/28/22 11:04   Complement C3 81 - 157 mg/dL 91   Complement C4 13 - 39 mg/dL 21   DNA-ds <10.0 IU/mL 4.3       Imaging: Chest xray at [facility], WNL.   3 views bilateral hand radiographs 8/1/2022 4:13 PM                                                                    Impression:  1. No acute osseous abnormality.  2. No substantial degenerative change.              Assessment and Plan:     1) Early onset RA, with secondary sjogren's (Onset 2/22, dx 5/22,+RF, +CCP, +MAEGAN, +SSA, low c4) possible lupus. Continues with active mild pain swelling of R 3rd PIP otherwise doing well. Can only tolerate 3 tabs of HCQ a week.  Will continue this, encouraged to take daily if tolerance improves. Pt 8 wks pg. Reviewed SSA fetal heart block risk and benefits of HCQ decreasing this risk. If joint symptoms worsen in the setting of pregnancy recommend adding SSZ or imuran. Labs 12/22 Dsdna, c3 ,c4, cr, cbc, UA wnl.       2) + LTBI, + quant gold.   -Encouraged treatment, pt likely will do after family planning complete    Pt instructions:    1) Continue plaquenil up to 1 pill a day as tolerated    2) Try triamcinolone cream to your R 3rd PIP at night to see if it improves pain    3) See me back in August, sooner with worsening    4) If 3rd PIP worsens we can consider and injection of that joint     Pt states understanding and agreement to plan of care, has no further questions.     Shanice Drew, CNP  Rheumatology, [facility]

## 2023-04-12 NOTE — PROGRESS NOTES
Rheumatology Consult  Shanice Drew, DARLENE      Halle Leonheld  YOB: 1985    Age: 37 year old   MRN# [id number]       Date of Visit: 2023  Primary care provider: Cindy Mack              Subjective:     Halle is a 37 year old female presents today for follow-up for early RA, (Onset , dx ,+RF, +CCP, +MAEGAN, + SSA, low c4) with possible SLE.  Current therapy: Plaquenil 200 mg 1 tab 3 times a week.  +quant gold/LTBI.  , currently 8 wks pg MARK 23     Today:   Retried plaquenil and can not take more than 3 tabs a week or her SE of blurred vision, feeling, dizzy and over all yuck feeling return. Is 8 weeks pregnant.  Had MRI on L neck lymph node, believed to be benign but seeing ENT for Follow-up.   Joints: Doing good. Rare pain of knuckles.   Initial symptoms of RA developed a few months after stopping breast feeding, vitiligo happened first.       HPI: Started having sore hands  and she would wake up with quite sore hands, then her feet started doing the same thing. Felt like she was walking like her dad in am (who has RA).  Stiffness improves with moving after a couple of hours. Feels like fingers are tight full. No joint redness or warmth. Prior to onset had no illness or stressful event, she had quite BF 1 yr earlier.  Saw rheumatologist in May 2022 at [facility] and dx with early onset RA. Was unsure what to do with the options as she is actively trying to get pregnant.       ROS: Patient denies recent infections, fevers, JOHNSON, weight loss, diarrhea, SOB, mouth sores, new numbness or tingling. Has dry eye the last year and slight dry mouth, wakes up in the middle of the night to drink water. Had rash on abdomin and thought to be viral at the time . Denies chronic pain. Does get pain after eating avocado, nuts, quiona, veggies. Avoids gluten as camp up on a genetic screen.  LMP 2 weeks ago.         Past Rheum tx:      Social History  No tobacco or  "ETOH  Works interior design.     FMH:  Strong fmh RA    Active Problem list:  Patient Active Problem List    Diagnosis Date Noted     Rheumatoid factor positive 07/11/2022     Priority: Medium     Vitiligo 02/15/2022     Priority: Medium     Indication for care in labor or delivery 08/09/2019     Priority: Medium     Iron deficiency anemia 04/23/2016     Priority: Medium     Depressive disorder, not elsewhere classified 04/23/2016     Priority: Medium     Hypothyroidism 07/26/2005     Priority: Medium     Formatting of this note might be different from the original.  Subacute thyroiditis  2005 then became hypothyroid.  Formatting of this note might be different from the original.  Formatting of this note might be different from the original.  Subacute thyroiditis  2005 then became hypothyroid.              Objective:     Physical Exam  Blood Pressure 96/63   Pulse 66   Temperature 98.8  F (37.1  C) (Oral)   Height 1.702 m (5' 7.01\")   Weight 60.7 kg (133 lb 12.8 oz)   Oxygen Saturation 99%   Body Mass Index 20.95 kg/m    Body mass index is 20.95 kg/m .    Constitutional: Normal body habitus with out gross deformities. Well groomed.   Psych: nl judgement, orientation, memory, affect.  Eyes: wnl EOM, PERRLA, vision. conjunctiva injected.       Skin: no nail pitting, alopecia, rash, nodules or lesions of exposed areas of face, neck, bilateral upper and lower extremity   Neuro: Strength WNL of bilateral upper and lower extremity large muscle groups.     MSK- (TTP=tender to palpation, S=swelling, W=warmth, R=redness)  Shoulders, elbows, knees, ankles, feet _T/S  Wrists: -TTP/S/W/R ,FROM   Hands: R 3rd PIP mild +T/S. Bony prominence of 2-4 PIPs         Labs:    At [facility]   +MAEGAN 1:160 fine dense speckled   +SSA  +CCP,  +RF  +quant gold ?,   C3 78 (low)   DsDNA normal.    Latest Reference Range & Units 12/28/22 11:04   Complement C3 81 - 157 mg/dL 91   Complement C4 13 - 39 mg/dL 21   DNA-ds <10.0 IU/mL 4.3       Imaging: " Chest xray at [facility], WNL.   3 views bilateral hand radiographs 8/1/2022 4:13 PM                                                                    Impression:  1. No acute osseous abnormality.  2. No substantial degenerative change.              Assessment and Plan:     1) Early onset RA, with secondary sjogren's (Onset 2/22, dx 5/22,+RF, +CCP, +MAEGAN, +SSA, low c4) possible lupus. Continues with active mild pain swelling of R 3rd PIP otherwise doing well. Can only tolerate 3 tabs of HCQ a week.  Will continue this, encouraged to take daily if tolerance improves. Pt 8 wks pg. Reviewed SSA fetal heart block risk and benefits of HCQ decreasing this risk. If joint symptoms worsen in the setting of pregnancy recommend adding SSZ or imuran. Labs 12/22 Dsdna, c3 ,c4, cr, cbc, UA wnl.       2) + LTBI, + quant gold.   -Encouraged treatment, pt likely will do after family planning complete    Pt instructions:    1) Continue plaquenil up to 1 pill a day as tolerated    2) Try triamcinolone cream to your R 3rd PIP at night to see if it improves pain    3) See me back in August, sooner with worsening    4) If 3rd PIP worsens we can consider and injection of that joint     Pt states understanding and agreement to plan of care, has no further questions.     Shanice Drew, CNP  Rheumatology, [facility]

## 2023-04-12 NOTE — PATIENT INSTRUCTIONS
1) Continue plaquenil up to 1 pill a day as tolerated    2) Try triamcinolone cream to your R 3rd PIP at night to see if it improves pain    3) See me back in August, sooner with worsening    4) If 3rd PIP worsens we can consider and injection of that joint

## 2023-08-04 ENCOUNTER — APPOINTMENT (OUTPATIENT)
Dept: GENERAL RADIOLOGY | Facility: CLINIC | Age: 38
End: 2023-08-04
Attending: EMERGENCY MEDICINE
Payer: COMMERCIAL

## 2023-08-04 ENCOUNTER — HOSPITAL ENCOUNTER (EMERGENCY)
Facility: CLINIC | Age: 38
Discharge: HOME | End: 2023-08-04
Attending: EMERGENCY MEDICINE | Admitting: EMERGENCY MEDICINE
Payer: COMMERCIAL

## 2023-08-04 VITALS
OXYGEN SATURATION: 99 % | RESPIRATION RATE: 16 BRPM | HEART RATE: 76 BPM | BODY MASS INDEX: 20.83 KG/M2 | WEIGHT: 133 LBS | TEMPERATURE: 97.8 F

## 2023-08-04 DIAGNOSIS — R07.89 BURNING CHEST PAIN: ICD-10-CM

## 2023-08-04 LAB
ALBUMIN SERPL BCG-MCNC: 4.4 G/DL (ref 3.5–5.2)
ALP SERPL-CCNC: 45 U/L (ref 35–104)
ALT SERPL W P-5'-P-CCNC: 13 U/L (ref 0–50)
ANION GAP SERPL CALCULATED.3IONS-SCNC: 10 MMOL/L (ref 7–15)
AST SERPL W P-5'-P-CCNC: 16 U/L (ref 0–45)
ATRIAL RATE - MUSE: 57 BPM
BASOPHILS # BLD AUTO: 0.1 10E3/UL (ref 0–0.2)
BASOPHILS NFR BLD AUTO: 1 %
BILIRUB SERPL-MCNC: 0.2 MG/DL
BUN SERPL-MCNC: 8.4 MG/DL (ref 6–20)
CALCIUM SERPL-MCNC: 8.7 MG/DL (ref 8.6–10)
CHLORIDE SERPL-SCNC: 103 MMOL/L (ref 98–107)
CREAT SERPL-MCNC: 0.78 MG/DL (ref 0.51–0.95)
DEPRECATED HCO3 PLAS-SCNC: 25 MMOL/L (ref 22–29)
DIASTOLIC BLOOD PRESSURE - MUSE: NORMAL MMHG
EOSINOPHIL # BLD AUTO: 0.2 10E3/UL (ref 0–0.7)
EOSINOPHIL NFR BLD AUTO: 3 %
ERYTHROCYTE [DISTWIDTH] IN BLOOD BY AUTOMATED COUNT: 11.8 % (ref 10–15)
GFR SERPL CREATININE-BSD FRML MDRD: >90 ML/MIN/1.73M2
GLUCOSE SERPL-MCNC: 111 MG/DL (ref 70–99)
HCG SERPL QL: NEGATIVE
HCT VFR BLD AUTO: 39.2 % (ref 35–47)
HGB BLD-MCNC: 12.7 G/DL (ref 11.7–15.7)
HOLD SPECIMEN: NORMAL
IMM GRANULOCYTES # BLD: 0 10E3/UL
IMM GRANULOCYTES NFR BLD: 0 %
INTERPRETATION ECG - MUSE: NORMAL
LIPASE SERPL-CCNC: 61 U/L (ref 13–60)
LYMPHOCYTES # BLD AUTO: 2.5 10E3/UL (ref 0.8–5.3)
LYMPHOCYTES NFR BLD AUTO: 35 %
MCH RBC QN AUTO: 28.5 PG (ref 26.5–33)
MCHC RBC AUTO-ENTMCNC: 32.4 G/DL (ref 31.5–36.5)
MCV RBC AUTO: 88 FL (ref 78–100)
MONOCYTES # BLD AUTO: 0.6 10E3/UL (ref 0–1.3)
MONOCYTES NFR BLD AUTO: 8 %
NEUTROPHILS # BLD AUTO: 3.8 10E3/UL (ref 1.6–8.3)
NEUTROPHILS NFR BLD AUTO: 53 %
NRBC # BLD AUTO: 0 10E3/UL
NRBC BLD AUTO-RTO: 0 /100
P AXIS - MUSE: 41 DEGREES
PLATELET # BLD AUTO: 158 10E3/UL (ref 150–450)
POTASSIUM SERPL-SCNC: 4 MMOL/L (ref 3.4–5.3)
PR INTERVAL - MUSE: 116 MS
PROT SERPL-MCNC: 6.6 G/DL (ref 6.4–8.3)
QRS DURATION - MUSE: 72 MS
QT - MUSE: 412 MS
QTC - MUSE: 401 MS
R AXIS - MUSE: 57 DEGREES
RBC # BLD AUTO: 4.46 10E6/UL (ref 3.8–5.2)
SODIUM SERPL-SCNC: 138 MMOL/L (ref 136–145)
SYSTOLIC BLOOD PRESSURE - MUSE: NORMAL MMHG
T AXIS - MUSE: 64 DEGREES
TROPONIN T SERPL HS-MCNC: 13 NG/L
VENTRICULAR RATE- MUSE: 57 BPM
WBC # BLD AUTO: 7.1 10E3/UL (ref 4–11)

## 2023-08-04 PROCEDURE — 84703 CHORIONIC GONADOTROPIN ASSAY: CPT | Performed by: EMERGENCY MEDICINE

## 2023-08-04 PROCEDURE — 84484 ASSAY OF TROPONIN QUANT: CPT | Performed by: EMERGENCY MEDICINE

## 2023-08-04 PROCEDURE — 80053 COMPREHEN METABOLIC PANEL: CPT | Performed by: EMERGENCY MEDICINE

## 2023-08-04 PROCEDURE — 85025 COMPLETE CBC W/AUTO DIFF WBC: CPT | Performed by: EMERGENCY MEDICINE

## 2023-08-04 PROCEDURE — 83690 ASSAY OF LIPASE: CPT | Performed by: EMERGENCY MEDICINE

## 2023-08-04 PROCEDURE — 93005 ELECTROCARDIOGRAM TRACING: CPT

## 2023-08-04 PROCEDURE — 99285 EMERGENCY DEPT VISIT HI MDM: CPT | Mod: 25

## 2023-08-04 PROCEDURE — 36415 COLL VENOUS BLD VENIPUNCTURE: CPT | Performed by: EMERGENCY MEDICINE

## 2023-08-04 PROCEDURE — 71046 X-RAY EXAM CHEST 2 VIEWS: CPT

## 2023-08-04 ASSESSMENT — ACTIVITIES OF DAILY LIVING (ADL): ADLS_ACUITY_SCORE: 33

## 2023-08-05 NOTE — DISCHARGE INSTRUCTIONS
Call your doctor now or seek immediate medical care if:    You are dizzy or lightheaded, or feel like you may faint.     You have trouble breathing or are breathing faster and passing only a little urine.     You have new or worse belly pain.     You have a new or higher fever.     You have signs of dehydration, such as:  Dry eyes and a dry mouth.  Passing only a little urine.  Feeling thirstier than usual.     You have nausea or vomiting and can't keep fluids down.     You cannot pass stools or gas.     You have new or more blood in your stools or your stools are black and tarlike.   Watch closely for changes in your health, and be sure to contact your doctor if:    You have new or worse symptoms.     You are losing weight.     You do not get better as expected.

## 2023-08-05 NOTE — ED PROVIDER NOTES
History     Chief Complaint:  Chest Pain     The history is provided by the patient.      Halle Alcantara is a 38 year old female with history of hypothyroidism and rheumatoid arthritis who presents to the ED for evaluation of chest pain that began on Monday and has been worsening. Also states she has back pain on the right side. Patient has been walking around normally. Doesn't have pain with eating. Denies nausea, vomiting, or urinary symptoms.  No shortness of breath but has noted lightheadedness upon standing.  History of heartburn but this feels different.  Father with history of cardiac disease and heart failure, not believed to have started early in his life.    Independent Historian:   None - Patient Only    Review of External Notes:   Miscarriage visits in April, Neck surgery in June    Medications:    Plaquenil  Synthroid   Imitrex   Prilosec     Past Medical History:    Vitiligo  Hypothyroidism   Depression  Anemia   Rheumatoid arthritis   Sjogren syndrome with keratoconjunctivitis   Branchial cleft cyst  Anxiety   Migraines     Past Surgical History:    Branchial cyst excision    Physical Exam   Patient Vitals for the past 24 hrs:   Temp Pulse Resp SpO2 Weight   08/04/23 2002 97.8  F (36.6  C) 76 16 99 % 60.3 kg (133 lb)      Physical Exam  Eyes:               Sclera white; Pupils are equal and round  ENT:                External ears and nares normal  CV:                  Rate as above with regular rhythm   Resp:               Breath sounds clear and equal bilaterally                          Non-labored, no retractions or accessory muscle use  GI:                   Abdomen is soft, non-tender, non-distended                          No rebound tenderness or peritoneal features  MS:                  Moves all extremities  Skin:                Warm and dry  Neuro:             Speech is normal and fluent. No apparent deficit.    Emergency Department Course   ECG  ECG results from 08/04/23   EKG  12 lead     Value    Systolic Blood Pressure     Diastolic Blood Pressure     Ventricular Rate 57    Atrial Rate 57    OK Interval 116    QRS Duration 72        QTc 401    P Axis 41    R AXIS 57    T Axis 64    Interpretation ECG      Sinus bradycardia  Otherwise normal ECG  When compared with ECG of 15-NOV-2000 09:04,  PREVIOUS ECG IS PRESENT  Confirmed by GENERATED REPORT, COMPUTER ([id number]),  [place] ([id number]) on 8/4/2023 9:20:53 PM        Imaging:  Chest XR,  PA & LAT   Final Result   IMPRESSION: Hyperinflation both lungs. The lungs are otherwise clear. No pleural effusions. Heart size and pulmonary vascularity are within normal limits.         Report per radiology    Laboratory:  Labs Ordered and Resulted from Time of ED Arrival to Time of ED Departure   COMPREHENSIVE METABOLIC PANEL - Abnormal       Result Value    Sodium 138      Potassium 4.0      Chloride 103      Carbon Dioxide (CO2) 25      Anion Gap 10      Urea Nitrogen 8.4      Creatinine 0.78      Calcium 8.7      Glucose 111 (*)     Alkaline Phosphatase 45      AST 16      ALT 13      Protein Total 6.6      Albumin 4.4      Bilirubin Total 0.2      GFR Estimate >90     LIPASE - Abnormal    Lipase 61 (*)    TROPONIN T, HIGH SENSITIVITY - Normal    Troponin T, High Sensitivity 13     HCG QUALITATIVE PREGNANCY - Normal    hCG Serum Qualitative Negative     CBC WITH PLATELETS AND DIFFERENTIAL    WBC Count 7.1      RBC Count 4.46      Hemoglobin 12.7      Hematocrit 39.2      MCV 88      MCH 28.5      MCHC 32.4      RDW 11.8      Platelet Count 158      % Neutrophils 53      % Lymphocytes 35      % Monocytes 8      % Eosinophils 3      % Basophils 1      % Immature Granulocytes 0      NRBCs per 100 WBC 0      Absolute Neutrophils 3.8      Absolute Lymphocytes 2.5      Absolute Monocytes 0.6      Absolute Eosinophils 0.2      Absolute Basophils 0.1      Absolute Immature Granulocytes 0.0      Absolute NRBCs 0.0        Emergency Department  Course & Assessments:     Interventions:  Medications - No data to display     Independent Interpretation (X-rays, CTs, rhythm strip):  I independently reviewed the chest XR and see no PTx or effusion.     Assessments/Consultations/Discussion of Management or Tests:  ED Course as of 08/04/23 2154   Fri Aug 04, 2023   2006 I obtained history and examined the patient as noted above.    2132 I rechecked and updated the patient.      Social Determinants of Health affecting care:   None    Disposition:  The patient was discharged to home.     Impression & Plan    Medical Decision Making:  Halle Alcantara is here for evaluation of new chest pain.  EKG was obtained immediately and demonstrates no ischemic changes, pre-excitation, pericarditis or dysrhythmia.  Differential includes NSTEMI, pneumonia, pneumothorax, pleural effusion, esophageal spasm, GERD.  PERC negative as surgery was more than 4 weeks ago.  Blood work and CXR reassuring.  Troponin normal with more than 6 hours of symptoms; not ACS or myocarditis.  Patient has a low risk HEART score and is appropriate for outpatient management.  Sufficiently low risk for ACS that urgent stress testing order not indicated.  Possibility of GI source discussed with her.  Had been using ibuprofen post-op.  Known contributor to esophatitis, gastritis, ulcers.  Will have stop NSAIDs.  Trial of omeprazole.  If not improving then follow-up with PCP and/or GI.     Diagnosis:    ICD-10-CM    1. Burning chest pain  R07.89          Discharge Medications:  New Prescriptions    OMEPRAZOLE (PRILOSEC) 20 MG DR CAPSULE    Take 1 capsule (20 mg) by mouth daily for 14 days      Scribe Disclosure:  SONYA COX, am serving as a scribe at 9:04 PM on 8/4/2023 to document services personally performed by Mary Dewey MD based on my observations and the provider's statements to me.     8/4/2023   Mary Dewey MD Gosen, Christine Leigh, MD  08/05/23 [id number]

## 2023-08-05 NOTE — ED TRIAGE NOTES
Pt reports midsternal aching to chest since Monday - worse today. Denies sob, but reports lightheadedness upon standing for past couple days.      Triage Assessment       Row Name 08/04/23 2002       Respiratory WDL    Respiratory WDL WDL       Cardiac WDL    Cardiac WDL X  midsternal ache to chest since monday

## 2023-08-17 LAB
ALT SERPL-CCNC: 11 IU/L (ref 0–32)
AST SERPL-CCNC: 17 IU/L (ref 0–40)
CREATININE (EXTERNAL): 0.87 MG/DL (ref 0.57–1)
GFR ESTIMATED (EXTERNAL): 88 ML/MIN/1.73
GLUCOSE (EXTERNAL): 126 MG/DL (ref 70–99)
POTASSIUM (EXTERNAL): 4.4 MMOL/L (ref 3.5–5.2)

## 2023-08-25 ENCOUNTER — TRANSFERRED RECORDS (OUTPATIENT)
Dept: HEALTH INFORMATION MANAGEMENT | Facility: CLINIC | Age: 38
End: 2023-08-25

## 2023-08-25 PROCEDURE — 88305 TISSUE EXAM BY PATHOLOGIST: CPT | Performed by: PATHOLOGY

## 2023-08-28 ENCOUNTER — LAB REQUISITION (OUTPATIENT)
Dept: LAB | Facility: CLINIC | Age: 38
End: 2023-08-28

## 2023-08-28 DIAGNOSIS — K31.89 OTHER DISEASES OF STOMACH AND DUODENUM: ICD-10-CM

## 2023-08-28 DIAGNOSIS — K30 FUNCTIONAL DYSPEPSIA: ICD-10-CM

## 2023-08-28 DIAGNOSIS — R10.13 EPIGASTRIC PAIN: ICD-10-CM

## 2023-08-30 ENCOUNTER — OFFICE VISIT (OUTPATIENT)
Dept: RHEUMATOLOGY | Facility: CLINIC | Age: 38
End: 2023-08-30
Attending: NURSE PRACTITIONER
Payer: COMMERCIAL

## 2023-08-30 VITALS
DIASTOLIC BLOOD PRESSURE: 73 MMHG | SYSTOLIC BLOOD PRESSURE: 105 MMHG | WEIGHT: 133.25 LBS | HEART RATE: 65 BPM | BODY MASS INDEX: 20.86 KG/M2 | OXYGEN SATURATION: 100 %

## 2023-08-30 DIAGNOSIS — M25.812 MASS OF JOINT OF LEFT SHOULDER: Primary | ICD-10-CM

## 2023-08-30 DIAGNOSIS — R76.8 POSITIVE ANA (ANTINUCLEAR ANTIBODY): ICD-10-CM

## 2023-08-30 DIAGNOSIS — M05.79 RHEUMATOID ARTHRITIS INVOLVING MULTIPLE SITES WITH POSITIVE RHEUMATOID FACTOR (H): ICD-10-CM

## 2023-08-30 PROCEDURE — 99215 OFFICE O/P EST HI 40 MIN: CPT | Performed by: NURSE PRACTITIONER

## 2023-08-30 PROCEDURE — G0463 HOSPITAL OUTPT CLINIC VISIT: HCPCS | Performed by: NURSE PRACTITIONER

## 2023-08-30 RX ORDER — HYDROXYCHLOROQUINE SULFATE 200 MG/1
200 TABLET, FILM COATED ORAL DAILY
Qty: 90 TABLET | Refills: 3 | Status: SHIPPED | OUTPATIENT
Start: 2023-08-30

## 2023-08-30 ASSESSMENT — PAIN SCALES - GENERAL: PAINLEVEL: MILD PAIN (2)

## 2023-08-30 NOTE — PATIENT INSTRUCTIONS
1) Retry plaquenil 200 mg daily, work up to tolerated dose    2 ) See opthalmology and discuss possible dry eye/ Sjogren's     3) L shoulder US    4) Labs in the next month    5) Virtual appt with me in October, after that will get you transferred to new Rheumatology at [facility].

## 2023-08-30 NOTE — PROGRESS NOTES
Rheumatology Consult  Shanice Drew, DARLENE      Halle Leonheld  YOB: 1985    Age: 38 year old   MRN# [id number]       Date of Visit: 2023  Primary care provider: Cindy Mack              Subjective:     Halle is a 37 year old female presents today for follow-up for early RA, (Onset , dx ,+RF, +CCP, +MAEGAN, + SSA, low c4) with possible SLE.  Current therapy: stopped, Plaquenil 200 mg 1 tab 3 times a week.  +quant gold/LTBI.  ,    Today:   Previous plaquenil SE of blurred vision, feeling, dizzy and over all yuck feeling return.Stopped plaquenil 3 x a week and this did not improve previous symptoms as it had previously.  Joints: Had flare up after the miscarriage, doing well now. L 3rd  PIP is stiff, pain, swollen. Occ L knee pain, L MTPs feel full and painful at times. L shoulder hurts at times.  ROS: Miscarriage 2023 at approx 10-12 wks.  One migraine. Eyes feel dry. Increase in vision feeling off this summer.           Consult 2022 HPI: Started having sore hands  and she would wake up with quite sore hands, then her feet started doing the same thing. Felt like she was walking like her dad in am (who has RA).  Stiffness improves with moving after a couple of hours. Feels like fingers are tight full. No joint redness or warmth. Prior to onset had no illness or stressful event, she had quite BF 1 yr earlier.  Saw rheumatologist in May 2022 at [facility] and dx with early onset RA. Was unsure what to do with the options as she is actively trying to get pregnant. Initial symptoms of RA developed a few months after stopping breast feeding, vitiligo happened first.   ROS: Patient denies recent infections, fevers, JOHNSON, weight loss, diarrhea, SOB, mouth sores, new numbness or tingling. Has dry eye the last year and slight dry mouth, wakes up in the middle of the night to drink water. Had rash on abdomin and thought to be viral at the time . Denies chronic  pain. Does get pain after eating avocado, nuts, quiona, veggies. Avoids gluten as camp up on a genetic screen.  LMP 2 weeks ago.         Past Rheum tx:      Social History  No tobacco or ETOH  Works interior design.     FMH:  Strong fmh RA    Active Problem list:  Patient Active Problem List    Diagnosis Date Noted    Rheumatoid factor positive 07/11/2022     Priority: Medium    Vitiligo 02/15/2022     Priority: Medium    Indication for care in labor or delivery 08/09/2019     Priority: Medium    Iron deficiency anemia 04/23/2016     Priority: Medium    Depressive disorder, not elsewhere classified 04/23/2016     Priority: Medium    Hypothyroidism 07/26/2005     Priority: Medium     Formatting of this note might be different from the original.  Subacute thyroiditis  2005 then became hypothyroid.  Formatting of this note might be different from the original.  Formatting of this note might be different from the original.  Subacute thyroiditis  2005 then became hypothyroid.              Objective:     Physical Exam  Blood Pressure 105/73 (BP Location: Left arm, Patient Position: Sitting, Cuff Size: Adult Regular)   Pulse 65   Weight 60.4 kg (133 lb 4 oz)   Last Menstrual Period 08/25/2023 (Approximate)   Oxygen Saturation 100%   Body Mass Index 20.86 kg/m    Body mass index is 20.86 kg/m .    Constitutional: Normal body habitus with out gross deformities. Well groomed.   Psych: nl judgement, orientation, memory, affect.  Eyes: wnl EOM, PERRLA, vision, conjunctiva, sclera   ENT: wnl external ears, nose, hearing, lips, teeth, gums, throat. No mucous membrane lesions, normal saliva pool  Neck: no mass, thyroid enlargement, enlarged cervical lymph nodes or parotid glands  Resp: lungs clear to auscultation, nl work of breathing  CV: RRR, no murmurs, rubs or gallops, no edema  Skin: no nail pitting, alopecia, rash, nodules or lesions of exposed areas of face, neck, bilateral upper and lower extremity   Neuro: Strength  WNL of bilateral upper and lower extremity large muscle groups.     MSK- (T=tender to palpation, S=swelling)  TMJ: -T/S, FROM   Cervical spine:  FROM  Shoulders: -T/S, FROM. L shoulder has cystic quarter to nickel size mass that is soft/ rubbery that is slightly distal and lateral to AC joint.   Elbows: -T/S, FROM   Wrists: -T/S, FROM   Hands: L 3rd + mild T/S, FROM, Normal  strength. No dactylitis. Mild PIP bony changes  Hips: FROM  Knees: -T/S, FROM   Ankles: -T/S, FROM No enthesopathy or tenosynovitis.   Feet: -T/S, FROM . No dactylitis.         Labs:    At [facility]   +MAEGAN 1:160 fine dense speckled   +SSA  +CCP,  +RF  +quant gold ?,   C3 78 (low)   DsDNA normal.    Latest Reference Range & Units 12/28/22 11:04   Complement C3 81 - 157 mg/dL 91   Complement C4 13 - 39 mg/dL 21   DNA-ds <10.0 IU/mL 4.3       Imaging: Chest xray at [facility], WNL.   3 views bilateral hand radiographs 8/1/2022 4:13 PM                                                                    Impression:  1. No acute osseous abnormality.  2. No substantial degenerative change.              Assessment and Plan:     1) Early onset RA, with secondary sjogren's (Onset 2/22, dx 5/22,+RF, +CCP, +MAEGAN, +SSA, low c4) possible lupus. Continues with active mild pain swelling of L 3rd PIP, had minor flare after miscarriage 4/23, now with mild increase in arthralgias. Pt desiring and attempting pregnancy. Exam with mild TTP and swelling of L 3d PIP. Cystic mass of L shoulder. Labs 12/22 Dsdna, c3 ,c4, cr, cbc, UA wnl, CBC,cr update 8/23 and normal. Previously only tolerate 3 tabs of HCQ a week. Given +SSA and attempting pregnancy have encouraged pt to retry plaquenil.  Will update SLE labs in 1 month. Will do US of L shoulder to determine etiology of mass. See pt back in Oct to review results virtually, then will transfer pt to [facility].       2) + LTBI, + quant gold.   -Encouraged treatment, pt likely will do after family planning complete    Pt  instructions:    1) Retry plaquenil 200 mg daily, work up to tolerated dose    2 ) See opthalmology and discuss possible dry eye/ Sjogren's     3) L shoulder US    4) Labs in the next month    5) Virtual appt with me in October, after that will get you transferred to new Rheumatology at [place].     Pt states understanding and agreement to plan of care, has no further questions.   40 minute spent on the date of the encounter doing chart review, history and exam, documentation and further activities per the note    Shanice Drew, CNP  Rheumatology,  Health

## 2023-08-30 NOTE — Clinical Note
Pt wants to stick with Rheumatology at [place], female preference.  Dec ok, ok prior to that as well.  Shanice Drew, NP

## 2023-08-30 NOTE — LETTER
2023       RE: Halle Alcantara  [address]  [facility] [address]     Dear Colleague,    Thank you for referring your patient, Halle Alcantara, to the [facility] at [facility]. Please see a copy of my visit note below.        Rheumatology Consult  Shanice Drew CNP      Halle Alcantara  YOB: 1985    Age: 38 year old   MRN# [id number]       Date of Visit: 2023  Primary care provider: Cindy Mack              Subjective:     Halle is a 37 year old female presents today for follow-up for early RA, (Onset , dx ,+RF, +CCP, +MAEGAN, + SSA, low c4) with possible SLE.  Current therapy: stopped, Plaquenil 200 mg 1 tab 3 times a week.  +quant gold/LTBI.  ,    Today:   Previous plaquenil SE of blurred vision, feeling, dizzy and over all yuck feeling return.Stopped plaquenil 3 x a week and this did not improve previous symptoms as it had previously.  Joints: Had flare up after the miscarriage, doing well now. L 3rd  PIP is stiff, pain, swollen. Occ L knee pain, L MTPs feel full and painful at times. L shoulder hurts at times.  ROS: Miscarriage 2023 at approx 10-12 wks.  One migraine. Eyes feel dry. Increase in vision feeling off this summer.           Consult 2022 HPI: Started having sore hands  and she would wake up with quite sore hands, then her feet started doing the same thing. Felt like she was walking like her dad in am (who has RA).  Stiffness improves with moving after a couple of hours. Feels like fingers are tight full. No joint redness or warmth. Prior to onset had no illness or stressful event, she had quite BF 1 yr earlier.  Saw rheumatologist in May 2022 at [facility] and dx with early onset RA. Was unsure what to do with the options as she is actively trying to get pregnant. Initial symptoms of RA developed a few months after stopping breast feeding,  vitiligo happened first.   ROS: Patient denies recent infections, fevers, JOHNSON, weight loss, diarrhea, SOB, mouth sores, new numbness or tingling. Has dry eye the last year and slight dry mouth, wakes up in the middle of the night to drink water. Had rash on abdomin and thought to be viral at the time 5/22. Denies chronic pain. Does get pain after eating avocado, nuts, quiona, veggies. Avoids gluten as camp up on a genetic screen.  LMP 2 weeks ago.         Past Rheum tx:      Social History  No tobacco or ETOH  Works interior design.     FMH:  Strong fmh RA    Active Problem list:  Patient Active Problem List    Diagnosis Date Noted    Rheumatoid factor positive 07/11/2022     Priority: Medium    Vitiligo 02/15/2022     Priority: Medium    Indication for care in labor or delivery 08/09/2019     Priority: Medium    Iron deficiency anemia 04/23/2016     Priority: Medium    Depressive disorder, not elsewhere classified 04/23/2016     Priority: Medium    Hypothyroidism 07/26/2005     Priority: Medium     Formatting of this note might be different from the original.  Subacute thyroiditis  2005 then became hypothyroid.  Formatting of this note might be different from the original.  Formatting of this note might be different from the original.  Subacute thyroiditis  2005 then became hypothyroid.              Objective:     Physical Exam  Blood Pressure 105/73 (BP Location: Left arm, Patient Position: Sitting, Cuff Size: Adult Regular)   Pulse 65   Weight 60.4 kg (133 lb 4 oz)   Last Menstrual Period 08/25/2023 (Approximate)   Oxygen Saturation 100%   Body Mass Index 20.86 kg/m    Body mass index is 20.86 kg/m .    Constitutional: Normal body habitus with out gross deformities. Well groomed.   Psych: nl judgement, orientation, memory, affect.  Eyes: wnl EOM, PERRLA, vision, conjunctiva, sclera   ENT: wnl external ears, nose, hearing, lips, teeth, gums, throat. No mucous membrane lesions, normal saliva pool  Neck: no  mass, thyroid enlargement, enlarged cervical lymph nodes or parotid glands  Resp: lungs clear to auscultation, nl work of breathing  CV: RRR, no murmurs, rubs or gallops, no edema  Skin: no nail pitting, alopecia, rash, nodules or lesions of exposed areas of face, neck, bilateral upper and lower extremity   Neuro: Strength WNL of bilateral upper and lower extremity large muscle groups.     MSK- (T=tender to palpation, S=swelling)  TMJ: -T/S, FROM   Cervical spine:  FROM  Shoulders: -T/S, FROM. L shoulder has cystic quarter to nickel size mass that is soft/ rubbery that is slightly distal and lateral to AC joint.   Elbows: -T/S, FROM   Wrists: -T/S, FROM   Hands: L 3rd + mild T/S, FROM, Normal  strength. No dactylitis. Mild PIP bony changes  Hips: FROM  Knees: -T/S, FROM   Ankles: -T/S, FROM No enthesopathy or tenosynovitis.   Feet: -T/S, FROM . No dactylitis.         Labs:    At [facility]   +MAEGAN 1:160 fine dense speckled   +SSA  +CCP,  +RF  +quant gold ?,   C3 78 (low)   DsDNA normal.    Latest Reference Range & Units 12/28/22 11:04   Complement C3 81 - 157 mg/dL 91   Complement C4 13 - 39 mg/dL 21   DNA-ds <10.0 IU/mL 4.3       Imaging: Chest xray at [facility], WNL.   3 views bilateral hand radiographs 8/1/2022 4:13 PM                                                                    Impression:  1. No acute osseous abnormality.  2. No substantial degenerative change.              Assessment and Plan:     1) Early onset RA, with secondary sjogren's (Onset 2/22, dx 5/22,+RF, +CCP, +MAEGAN, +SSA, low c4) possible lupus. Continues with active mild pain swelling of L 3rd PIP, had minor flare after miscarriage 4/23, now with mild increase in arthralgias. Pt desiring and attempting pregnancy. Exam with mild TTP and swelling of L 3d PIP. Cystic mass of L shoulder. Labs 12/22 Dsdna, c3 ,c4, cr, cbc, UA wnl, CBC,cr update 8/23 and normal. Previously only tolerate 3 tabs of HCQ a week. Given +SSA and attempting pregnancy have encouraged  pt to retry plaquenil.  Will update SLE labs in 1 month. Will do US of L shoulder to determine etiology of mass. See pt back in Oct to review results virtually, then will transfer pt to [facility].       2) + LTBI, + quant gold.   -Encouraged treatment, pt likely will do after family planning complete    Pt instructions:    1) Retry plaquenil 200 mg daily, work up to tolerated dose    2 ) See opthalmology and discuss possible dry eye/ Sjogren's     3) L shoulder US    4) Labs in the next month    5) Virtual appt with me in October, after that will get you transferred to new Rheumatology at [place].     Pt states understanding and agreement to plan of care, has no further questions.   40 minute spent on the date of the encounter doing chart review, history and exam, documentation and further activities per the note    Shanice Drew, CNP  Rheumatology,  Health

## 2023-08-31 LAB
PATH REPORT.COMMENTS IMP SPEC: NORMAL
PATH REPORT.FINAL DX SPEC: NORMAL
PATH REPORT.GROSS SPEC: NORMAL
PATH REPORT.MICROSCOPIC SPEC OTHER STN: NORMAL
PATH REPORT.RELEVANT HX SPEC: NORMAL
PHOTO IMAGE: NORMAL

## 2023-09-15 ENCOUNTER — TELEPHONE (OUTPATIENT)
Dept: RHEUMATOLOGY | Facility: CLINIC | Age: 38
End: 2023-09-15

## 2023-09-15 NOTE — TELEPHONE ENCOUNTER
----- Message from Thuy Guadarrama RN sent at 9/6/2023  3:05 PM CDT -----  Please contact this patient to schedule an appointment with Dr. Garcia at [place], she is due for a visit in December 2023. She doesn't want to follow Shanice to [facility]. Thank you very much!!

## 2023-10-12 ENCOUNTER — LAB (OUTPATIENT)
Dept: LAB | Facility: CLINIC | Age: 38
End: 2023-10-12
Payer: COMMERCIAL

## 2023-10-12 ENCOUNTER — ANCILLARY PROCEDURE (OUTPATIENT)
Dept: ULTRASOUND IMAGING | Facility: CLINIC | Age: 38
End: 2023-10-12
Attending: NURSE PRACTITIONER
Payer: COMMERCIAL

## 2023-10-12 DIAGNOSIS — M25.812 MASS OF JOINT OF LEFT SHOULDER: ICD-10-CM

## 2023-10-12 DIAGNOSIS — M05.79 RHEUMATOID ARTHRITIS INVOLVING MULTIPLE SITES WITH POSITIVE RHEUMATOID FACTOR (H): ICD-10-CM

## 2023-10-12 DIAGNOSIS — R76.8 POSITIVE ANA (ANTINUCLEAR ANTIBODY): ICD-10-CM

## 2023-10-12 LAB
ALBUMIN UR-MCNC: NEGATIVE MG/DL
APPEARANCE UR: CLEAR
BILIRUB UR QL STRIP: NEGATIVE
COLOR UR AUTO: ABNORMAL
CRP SERPL-MCNC: <3 MG/L
ERYTHROCYTE [SEDIMENTATION RATE] IN BLOOD BY WESTERGREN METHOD: 2 MM/HR (ref 0–20)
GLUCOSE UR STRIP-MCNC: NEGATIVE MG/DL
HGB UR QL STRIP: NEGATIVE
KETONES UR STRIP-MCNC: NEGATIVE MG/DL
LEUKOCYTE ESTERASE UR QL STRIP: NEGATIVE
MUCOUS THREADS #/AREA URNS LPF: PRESENT /LPF
NITRATE UR QL: NEGATIVE
PH UR STRIP: 6 [PH] (ref 5–7)
RBC URINE: 0 /HPF
SP GR UR STRIP: 1.01 (ref 1–1.03)
UROBILINOGEN UR STRIP-MCNC: NORMAL MG/DL
WBC URINE: 0 /HPF

## 2023-10-12 PROCEDURE — 99000 SPECIMEN HANDLING OFFICE-LAB: CPT | Performed by: PATHOLOGY

## 2023-10-12 PROCEDURE — 86140 C-REACTIVE PROTEIN: CPT | Performed by: PATHOLOGY

## 2023-10-12 PROCEDURE — 36415 COLL VENOUS BLD VENIPUNCTURE: CPT | Performed by: PATHOLOGY

## 2023-10-12 PROCEDURE — 86160 COMPLEMENT ANTIGEN: CPT | Performed by: NURSE PRACTITIONER

## 2023-10-12 PROCEDURE — 81001 URINALYSIS AUTO W/SCOPE: CPT | Performed by: PATHOLOGY

## 2023-10-12 PROCEDURE — 76881 US COMPL JOINT R-T W/IMG: CPT | Mod: GC | Performed by: RADIOLOGY

## 2023-10-12 PROCEDURE — 85652 RBC SED RATE AUTOMATED: CPT | Performed by: PATHOLOGY

## 2023-10-12 PROCEDURE — 86225 DNA ANTIBODY NATIVE: CPT | Performed by: NURSE PRACTITIONER

## 2023-10-13 LAB
C3 SERPL-MCNC: 91 MG/DL (ref 81–157)
C4 SERPL-MCNC: 18 MG/DL (ref 13–39)

## 2023-10-16 LAB — DSDNA AB SER-ACNC: 5 IU/ML

## 2023-10-23 DIAGNOSIS — R59.9 SWOLLEN LYMPH NODES: Primary | ICD-10-CM

## 2023-10-27 ENCOUNTER — TRANSFERRED RECORDS (OUTPATIENT)
Dept: HEALTH INFORMATION MANAGEMENT | Facility: CLINIC | Age: 38
End: 2023-10-27

## 2023-11-03 ENCOUNTER — HOSPITAL ENCOUNTER (OUTPATIENT)
Dept: MAMMOGRAPHY | Facility: CLINIC | Age: 38
Discharge: HOME | End: 2023-11-03
Attending: NURSE PRACTITIONER
Payer: COMMERCIAL

## 2023-11-03 DIAGNOSIS — R59.9 SWOLLEN LYMPH NODES: ICD-10-CM

## 2023-11-03 PROCEDURE — 77062 BREAST TOMOSYNTHESIS BI: CPT

## 2023-11-05 ENCOUNTER — HEALTH MAINTENANCE LETTER (OUTPATIENT)
Age: 38
End: 2023-11-05

## 2023-11-15 ENCOUNTER — TRANSFERRED RECORDS (OUTPATIENT)
Dept: HEALTH INFORMATION MANAGEMENT | Facility: CLINIC | Age: 38
End: 2023-11-15

## 2023-12-11 ENCOUNTER — OFFICE VISIT (OUTPATIENT)
Dept: RHEUMATOLOGY | Facility: CLINIC | Age: 38
End: 2023-12-11
Attending: INTERNAL MEDICINE
Payer: COMMERCIAL

## 2023-12-11 VITALS
WEIGHT: 134 LBS | TEMPERATURE: 97.5 F | HEART RATE: 62 BPM | OXYGEN SATURATION: 99 % | BODY MASS INDEX: 20.98 KG/M2 | DIASTOLIC BLOOD PRESSURE: 65 MMHG | SYSTOLIC BLOOD PRESSURE: 103 MMHG

## 2023-12-11 DIAGNOSIS — R76.8 SS-A ANTIBODY POSITIVE: ICD-10-CM

## 2023-12-11 DIAGNOSIS — R76.12 POSITIVE QUANTIFERON-TB GOLD TEST: ICD-10-CM

## 2023-12-11 DIAGNOSIS — R76.8 RHEUMATOID FACTOR POSITIVE: ICD-10-CM

## 2023-12-11 DIAGNOSIS — Z79.899 LONG-TERM USE OF HIGH-RISK MEDICATION: ICD-10-CM

## 2023-12-11 DIAGNOSIS — R76.8 POSITIVE ANA (ANTINUCLEAR ANTIBODY): ICD-10-CM

## 2023-12-11 DIAGNOSIS — R76.8 CYCLIC CITRULLINATED PEPTIDE (CCP) ANTIBODY POSITIVE: ICD-10-CM

## 2023-12-11 DIAGNOSIS — M05.9 SEROPOSITIVE RHEUMATOID ARTHRITIS (H): Primary | ICD-10-CM

## 2023-12-11 DIAGNOSIS — D51.0 PERNICIOUS ANEMIA: ICD-10-CM

## 2023-12-11 PROCEDURE — 99215 OFFICE O/P EST HI 40 MIN: CPT | Performed by: INTERNAL MEDICINE

## 2023-12-11 PROCEDURE — 99417 PROLNG OP E/M EACH 15 MIN: CPT | Performed by: INTERNAL MEDICINE

## 2023-12-11 PROCEDURE — 99213 OFFICE O/P EST LOW 20 MIN: CPT | Performed by: INTERNAL MEDICINE

## 2023-12-11 ASSESSMENT — PAIN SCALES - GENERAL: PAINLEVEL: MILD PAIN (3)

## 2023-12-11 NOTE — PROGRESS NOTES
12/11/2023    Chief Complaint/Reason for Visit: seropositive RA    HPI:    Halle Alcantara is a 38 year old White female with past medical history listed below.   She was first seen by rheumatologist () at [facility] in July 2022 for bilateral hand and feet pain and swelling. She was started on HCQ.She has been taking HCQ daily when she developed lightheadedness and thereafter she reduced it to 200 mg 3 times/week. Morning stiffness lasts for about an hour. Denied having pain or swelling of joints. She said HCQ may have helped her to some extent.denied having dactylitis, low back pain, personal or FH of psoriatic arthritis or psoriasis, uveitis.     REVIEW OF SYSTEMS    General - pos for fatigue  HEENT - pos for dry eyes and dry mouth   Cardiovascular - neg for chest pain   Respiratory - neg for cough or sob   Gastrointestinal - denied having bloody diarrhea or difficulty with swallowing, recent Dx of pernicious anemia  Genitourinary - neg for blood in urine, difficulty with urination   Skin - denied skin rashes, pos for history of vitiligo , neg for malar rash,raynaud's.   Neuro - neg for numbness and tingling, h/o migraine  Hematologic - neg for blood clots  Psych - neg for anxiety or depression   Pregnant? - not currently, had a miscarriage at 12 weeks in last April. Has one child who is 4 years old now - normal delivery, full term healthy baby. She is interested in having another baby in the near future.  Does not smoke, occasionally drinks alcohol. No use of drugs       No past medical history on file.  No past surgical history on file.  Family History   Problem Relation Age of Onset    Thyroid Disease Mother     Rheumatoid Arthritis Father     Diabetes Type 1 Sister     Thyroid Disease Sister      Social History     Socioeconomic History    Marital status:    Tobacco Use    Smoking status: Never    Smokeless tobacco: Never   Substance and Sexual Activity     Alcohol use: Not Currently    Drug use: Yes    Sexual activity: Yes     Partners: Male       Allergies   Allergen Reactions    Cephalosporins        Current Outpatient Medications   Medication    hydroxychloroquine (PLAQUENIL) 200 MG tablet    levothyroxine (SYNTHROID/LEVOTHROID) 112 MCG tablet    pimecrolimus (ELIDEL) 1 % external cream    Prenatal MV-Min-Fe Fum-FA-DHA (PRENATAL MULTIVITAMIN + DHA PO)    ketoconazole (NIZORAL) 2 % external cream    tacrolimus (PROTOPIC) 0.1 % external ointment    triamcinolone (KENALOG) 0.1 % external cream     No current facility-administered medications for this visit.       PHYSICAL EXAM    /65 (BP Location: Left arm, Patient Position: Sitting, Cuff Size: Adult Regular)   Pulse 62   Temp 97.5  F (36.4  C) (Oral)   Wt 60.8 kg (134 lb)   SpO2 99%   BMI 20.98 kg/m        General: Alert, No apparent distress  Eyes: Sclera noninjected  Ears, Nose, Throat, Mouth: Oral mucosa moist with normal salivary pool  Skin: No rashes noted  Neck: No lymphadenopathy  Cardiovascular: Regular rate and rhythm, Normal S1 and S2 and No murmurs, rubs or gallops  Respiratory: Clear to auscultation with no wheezing or crackles  Neuro: Normal gait and Able to arise from seated position unassisted  Musculoskeletal: Hands:  Normal.  Wrists:  Normal.  Elbows:  Normal.  Shoulders:  Normal.  Feet:  Normal.  Ankles:  Normal.  Knees:  Normal.  Hips:  Normal.  Spine:  Normal.  Tender points:  Negative.  No joint swelling, warmth or tenderness in the DIP, PIP, MCP, wrist, elbow, MTP, ankle or knee joints bilaterally. Full ROM of these joints, hips and shoulders bilaterally        LABS   Reviewed as below.     Oct 2023  Ds DNA, c3, c4,CRP normal  UA neg for blood or protein     Aug 2023  CBC, CMP normal     March 2023  HCV neg     2022  RF 50  CCP 80  QTB pos   Hep B surface ag and core ab neg   MAEGAN 1:160 dense fine speckled   SSA 52 pos   Sm/RNP, SSA 60, SSB, ds DNA neg   Lupus anticoagulant neg    Cardiolipin Ig A and beta 2 glycoprotein Ig G, A and M neg     Exam: TEMPORARY, 10/12/2023 1:20 PM     Indication: Shoulder round cystic-like mass within tendon/muscle  structure, shoulder pain. History of rheumatoid arthritis, rule out RA  nodule, rule out abnormal lymph node.     Comparison: Chest x-ray 8/4/2023     Technique: Real-time sonographic evaluation of the left shoulder  palpable area. Still and cine images obtained and archived.     Findings:      In the patient's palpable area of concern in the superior left  shoulder there is a circumscribed hypoechoic nodule which measures 0.5  x 0.5 x 0.9 cm. No internal vascularity on color Doppler imaging.      Additional smaller hypoechoic nodule in the anterior left shoulder,  anterior to the second/third rib labeled #2 measures 0.2 x 0.5 x 0.6  cm. No internal vascularity on Doppler imaging.     Multiple additional subcentimeter benign lymph nodes with retained  fatty sariah about the left shoulder.                                                                      Impression:      1. Circumscribed hypoechoic nodule corresponding to the patient's  palpable area of concern in the superior left shoulder which may  represent a rheumatoid nodule.  2. Smaller circumscribed hypoechoic nodule anterior to the  second/third rib may represent a additional rheumatoid nodule versus  reactive lymph node.    ASSESSMENT      1. Seropositive rheumatoid arthritis (H)    2. Rheumatoid factor positive    3. Cyclic citrullinated peptide (CCP) antibody positive    4. Positive MAEGAN (antinuclear antibody)    5. SS-A antibody positive    6. Long-term use of high-risk medication    7. 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 Pernicious anemia        (M05.9) Seropositive rheumatoid arthritis (H)  (primary encounter diagnosis)  (R76.8) Rheumatoid factor positive  (R76.8) Cyclic citrullinated peptide (CCP) antibody positive  Comment: RF 50, CCP-80. X ray hands in 2022 did not show any  abnormalities. Currently she is on hydroxychloroquine 200 mg thrice a week. She has been taking HCQ daily when she developed lightheadedness and thereafter she reduced it to 200 mg 3 times/week.She may have a mild case of RA. Left shoulder USG - rheumatoid nodule.  Plan:   Continue  mg thrice a week.   Ref to Ophthalmology for screening for toxicity from plaquenil  ID ref placed for pos QTB. Recommended following up with ID prior to planning pregnancy.   If she becomes pregnant in future, she may try going back on HCQ daily to see if she can tolerate, discussed the importance of plaquenil in terms of protecting the baby from developing  heart block.  Orders Placed This Encounter   Procedures    Glucose 6 phosphate dehydrogenase    Cardiolipin Kacie IgG and IgM    CRP inflammation    Comprehensive metabolic panel    Protein electrophoresis random urine    Adult Infectious Disease  Referral    Adult Eye  Referral    CBC with platelets differential    Protein electrophoresis       (R76.8) Positive MAEGAN (antinuclear antibody)  (R76.8) SS-A antibody positive  Comment: MAEGAN 1:160 dense fine speckled, SSA 52 pos. On HCQ.  Plan: continue HCQ     (Z79.899) Long-term use of high-risk medication  Comment: Hydroxychloroquine   Plan: have explained potential side effects of Plaquenil including but not limited to retinal toxicity, need for annual retinal eye exams by an ophthalmologist, skin pigmentation, possible QTc prolongation and cause for cardiac arrhythmias especially with medication interactions, possible GI upset, possible muscle weakness.     (R76.12) Positive QuantiFERON-TB Gold test  Comment: noted  Plan: ref to ID     Pernicious anemia  Comment : Dxed by GI   Plan : follow up with GI     RTC - 3-4 months.    I spent 70 minutes on the date of the encounter doing chart review, history and exam, documentation and orders per the note.      GALLO CUELLAR MD    Division of  Rheumatic & Autoimmune Diseases  [facility]

## 2023-12-11 NOTE — NURSING NOTE
Chief Complaint   Patient presents with    Consult   /65 (BP Location: Left arm, Patient Position: Sitting, Cuff Size: Adult Regular)   Pulse 62   Temp 97.5  F (36.4  C) (Oral)   Wt 60.8 kg (134 lb)   SpO2 99%   BMI 20.98 kg/m  Kelly Chery on 12/11/2023 at 12:57 PM

## 2023-12-11 NOTE — LETTER
12/11/2023       RE: Halle Alcantara  [address]  [facility] [address]     Dear Colleague,    Thank you for referring your patient, Halle Alcantara, to the [facility] at [facility]. Please see a copy of my visit note below.                       12/11/2023    Chief Complaint/Reason for Visit: seropositive RA    HPI:    Halle Alcantara is a 38 year old White female with past medical history listed below.  She has been taking HCQ daily when she developed lightheadedness and thereafter she reduced it to 200 mg 3 times/week. She was first seen by rheumatologist () at [facility] in July 2022 for bilateral hand and feet pain and swelling. Morning stiffness lasts for about an hour. Denied having pain or swelling of joints. She said HCQ may have helped her to some extent.denied having dactylitis, low back pain, personal or FH of psoriatic arthritis or psoriasis, uveitis.     REVIEW OF SYSTEMS    General - pos for fatigue  HEENT - pos for dry eyes and dry mouth   Cardiovascular - neg for chest pain   Respiratory - neg for cough or sob   Gastrointestinal - denied having bloody diarrhea or difficulty with swallowing, recent Dx of pernicious anemia  Genitourinary - neg for blood in urine, difficulty with urination   Skin - denied skin rashes, pos for history of vitiligo , neg for malar rash,raynaud's.   Neuro - neg for numbness and tingling, h/o migraine  Hematologic - neg for blood clots  Psych - neg for anxiety or depression   Pregnant? - not currently, had a miscarriage at 12 weeks in last April. Has one child who is 4 years old now - normal delivery, full term healthy baby.  Does not smoke, occasionally drinks alcohol. No use of drugs       No past medical history on file.  No past surgical history on file.  Family History   Problem Relation Age of Onset    Thyroid Disease Mother     Rheumatoid  Arthritis Father     Diabetes Type 1 Sister     Thyroid Disease Sister      Social History     Socioeconomic History    Marital status:    Tobacco Use    Smoking status: Never    Smokeless tobacco: Never   Substance and Sexual Activity    Alcohol use: Not Currently    Drug use: Yes    Sexual activity: Yes     Partners: Male       Allergies   Allergen Reactions    Cephalosporins        Current Outpatient Medications   Medication    hydroxychloroquine (PLAQUENIL) 200 MG tablet    levothyroxine (SYNTHROID/LEVOTHROID) 112 MCG tablet    pimecrolimus (ELIDEL) 1 % external cream    Prenatal MV-Min-Fe Fum-FA-DHA (PRENATAL MULTIVITAMIN + DHA PO)    ketoconazole (NIZORAL) 2 % external cream    tacrolimus (PROTOPIC) 0.1 % external ointment    triamcinolone (KENALOG) 0.1 % external cream     No current facility-administered medications for this visit.       PHYSICAL EXAM    /65 (BP Location: Left arm, Patient Position: Sitting, Cuff Size: Adult Regular)   Pulse 62   Temp 97.5  F (36.4  C) (Oral)   Wt 60.8 kg (134 lb)   SpO2 99%   BMI 20.98 kg/m        General: Alert, No apparent distress  Eyes: Sclera noninjected  Ears, Nose, Throat, Mouth: Oral mucosa moist with normal salivary pool  Skin: No rashes noted  Neck: No lymphadenopathy  Cardiovascular: Regular rate and rhythm, Normal S1 and S2 and No murmurs, rubs or gallops  Respiratory: Clear to auscultation with no wheezing or crackles  Neuro: Normal gait and Able to arise from seated position unassisted  Musculoskeletal: Hands:  Normal.  Wrists:  Normal.  Elbows:  Normal.  Shoulders:  Normal.  Feet:  Normal.  Ankles:  Normal.  Knees:  Normal.  Hips:  Normal.  Spine:  Normal.  Tender points:  Negative.  No joint swelling, warmth or tenderness in the DIP, PIP, MCP, wrist, elbow, MTP, ankle or knee joints bilaterally. Full ROM of these joints, hips and shoulders bilaterally        LABS   Reviewed as below.     Oct 2023  Ds DNA, c3, c4,CRP normal  UA neg for blood  or protein     Aug 2023  CBC, CMP normal     March 2023  HCV neg     2022  RF 50  CCP 80  QTB pos   Hep B surface ag and core ab neg   MAEGAN 1:160 dense fine speckled   SSA 52 pos   Sm/RNP, SSA 60, SSB, ds DNA neg   Lupus anticoagulant neg   Cardiolipin Ig A and beta 2 glycoprotein Ig G, A and M neg     Exam: TEMPORARY, 10/12/2023 1:20 PM     Indication: Shoulder round cystic-like mass within tendon/muscle  structure, shoulder pain. History of rheumatoid arthritis, rule out RA  nodule, rule out abnormal lymph node.     Comparison: Chest x-ray 8/4/2023     Technique: Real-time sonographic evaluation of the left shoulder  palpable area. Still and cine images obtained and archived.     Findings:      In the patient's palpable area of concern in the superior left  shoulder there is a circumscribed hypoechoic nodule which measures 0.5  x 0.5 x 0.9 cm. No internal vascularity on color Doppler imaging.      Additional smaller hypoechoic nodule in the anterior left shoulder,  anterior to the second/third rib labeled #2 measures 0.2 x 0.5 x 0.6  cm. No internal vascularity on Doppler imaging.     Multiple additional subcentimeter benign lymph nodes with retained  fatty sariah about the left shoulder.                                                                      Impression:      1. Circumscribed hypoechoic nodule corresponding to the patient's  palpable area of concern in the superior left shoulder which may  represent a rheumatoid nodule.  2. Smaller circumscribed hypoechoic nodule anterior to the  second/third rib may represent a additional rheumatoid nodule versus  reactive lymph node.    ASSESSMENT      1. Seropositive rheumatoid arthritis (H)    2. Rheumatoid factor positive    3. Cyclic citrullinated peptide (CCP) antibody positive    4. Positive MAEGAN (antinuclear antibody)    5. SS-A antibody positive    6. Long-term use of high-risk medication    7. 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 Pernicious anemia         (M05.9) Seropositive rheumatoid arthritis (H)  (primary encounter diagnosis)  (R76.8) Rheumatoid factor positive  (R76.8) Cyclic citrullinated peptide (CCP) antibody positive  Comment: RF 50, CCP-80. X ray hands in  did not show any abnormalities. Currently she is on hydroxychloroquine 200 mg thrice a week. She has been taking HCQ daily when she developed lightheadedness and thereafter she reduced it to 200 mg 3 times/week.She may have a mild case of RA. Left shoulder USG - rheumatoid nodule.  Plan:   Continue  mg thrice a week.   Ref to Ophthalmology for screening for toxicity from plaquenil  ID ref placed for pos QTB. Recommended following up with ID prior to planning pregnancy.   If she becomes pregnant in future, she may try going back on HCQ daily to see if she can tolerate, discussed the importance of plaquenil in terms of protecting the baby from developing  heart block.  Orders Placed This Encounter   Procedures    Glucose 6 phosphate dehydrogenase    Cardiolipin Kacie IgG and IgM    CRP inflammation    Comprehensive metabolic panel    Protein electrophoresis random urine    Adult Infectious Disease  Referral    Adult Eye  Referral    CBC with platelets differential    Protein electrophoresis       (R76.8) Positive MAEGAN (antinuclear antibody)  (R76.8) SS-A antibody positive  Comment: MAEGAN 1:160 dense fine speckled, SSA 52 pos. On HCQ.  Plan: continue HCQ     (Z79.899) Long-term use of high-risk medication  Comment: Hydroxychloroquine   Plan: have explained potential side effects of Plaquenil including but not limited to retinal toxicity, need for annual retinal eye exams by an ophthalmologist, skin pigmentation, possible QTc prolongation and cause for cardiac arrhythmias especially with medication interactions, possible GI upset, possible muscle weakness.     (R76.12) Positive QuantiFERON-TB Gold test  Comment: noted  Plan: ref to ID     Pernicious anemia  Comment :  Dxed by GI   Plan : follow up with GI     RTC - 3-4 months.    I spent 70 minutes on the date of the encounter doing chart review, history and exam, documentation and orders per the note.      GALLO CUELLAR MD    Division of Rheumatic & Autoimmune Diseases  [facility]

## 2023-12-12 ENCOUNTER — TELEPHONE (OUTPATIENT)
Dept: INFECTIOUS DISEASES | Facility: CLINIC | Age: 38
End: 2023-12-12
Payer: COMMERCIAL

## 2023-12-13 ENCOUNTER — LAB (OUTPATIENT)
Dept: LAB | Facility: CLINIC | Age: 38
End: 2023-12-13
Payer: COMMERCIAL

## 2023-12-13 DIAGNOSIS — R76.8 RHEUMATOID FACTOR POSITIVE: ICD-10-CM

## 2023-12-13 DIAGNOSIS — M05.9 SEROPOSITIVE RHEUMATOID ARTHRITIS (H): ICD-10-CM

## 2023-12-13 DIAGNOSIS — R76.12 POSITIVE QUANTIFERON-TB GOLD TEST: ICD-10-CM

## 2023-12-13 DIAGNOSIS — R76.8 POSITIVE ANA (ANTINUCLEAR ANTIBODY): ICD-10-CM

## 2023-12-13 DIAGNOSIS — R76.8 CYCLIC CITRULLINATED PEPTIDE (CCP) ANTIBODY POSITIVE: ICD-10-CM

## 2023-12-13 DIAGNOSIS — R76.8 SS-A ANTIBODY POSITIVE: ICD-10-CM

## 2023-12-13 DIAGNOSIS — Z79.899 LONG-TERM USE OF HIGH-RISK MEDICATION: ICD-10-CM

## 2023-12-13 DIAGNOSIS — D51.0 PERNICIOUS ANEMIA: ICD-10-CM

## 2023-12-13 LAB
ALBUMIN SERPL BCG-MCNC: 4.4 G/DL (ref 3.5–5.2)
ALP SERPL-CCNC: 44 U/L (ref 40–150)
ALT SERPL W P-5'-P-CCNC: 13 U/L (ref 0–50)
ANION GAP SERPL CALCULATED.3IONS-SCNC: 8 MMOL/L (ref 7–15)
AST SERPL W P-5'-P-CCNC: 18 U/L (ref 0–45)
BASOPHILS # BLD AUTO: 0.1 10E3/UL (ref 0–0.2)
BASOPHILS NFR BLD AUTO: 2 %
BILIRUB SERPL-MCNC: 0.6 MG/DL
BUN SERPL-MCNC: 10.8 MG/DL (ref 6–20)
CALCIUM SERPL-MCNC: 8.8 MG/DL (ref 8.6–10)
CHLORIDE SERPL-SCNC: 104 MMOL/L (ref 98–107)
CREAT SERPL-MCNC: 0.88 MG/DL (ref 0.51–0.95)
CRP SERPL-MCNC: <3 MG/L
DEPRECATED HCO3 PLAS-SCNC: 28 MMOL/L (ref 22–29)
EGFRCR SERPLBLD CKD-EPI 2021: 86 ML/MIN/1.73M2
EOSINOPHIL # BLD AUTO: 0.2 10E3/UL (ref 0–0.7)
EOSINOPHIL NFR BLD AUTO: 2 %
ERYTHROCYTE [DISTWIDTH] IN BLOOD BY AUTOMATED COUNT: 12.6 % (ref 10–15)
GLUCOSE SERPL-MCNC: 78 MG/DL (ref 70–99)
HCT VFR BLD AUTO: 40.2 % (ref 35–47)
HGB BLD-MCNC: 13.1 G/DL (ref 11.7–15.7)
IMM GRANULOCYTES # BLD: 0 10E3/UL
IMM GRANULOCYTES NFR BLD: 0 %
LYMPHOCYTES # BLD AUTO: 1.5 10E3/UL (ref 0.8–5.3)
LYMPHOCYTES NFR BLD AUTO: 24 %
MCH RBC QN AUTO: 29 PG (ref 26.5–33)
MCHC RBC AUTO-ENTMCNC: 32.6 G/DL (ref 31.5–36.5)
MCV RBC AUTO: 89 FL (ref 78–100)
MONOCYTES # BLD AUTO: 0.6 10E3/UL (ref 0–1.3)
MONOCYTES NFR BLD AUTO: 10 %
NEUTROPHILS # BLD AUTO: 3.8 10E3/UL (ref 1.6–8.3)
NEUTROPHILS NFR BLD AUTO: 62 %
NRBC # BLD AUTO: 0 10E3/UL
NRBC BLD AUTO-RTO: 0 /100
PLATELET # BLD AUTO: 151 10E3/UL (ref 150–450)
POTASSIUM SERPL-SCNC: 4.1 MMOL/L (ref 3.4–5.3)
PROT SERPL-MCNC: 6.7 G/DL (ref 6.4–8.3)
RBC # BLD AUTO: 4.52 10E6/UL (ref 3.8–5.2)
SODIUM SERPL-SCNC: 140 MMOL/L (ref 135–145)
TOTAL PROTEIN SERUM FOR ELP: 6.5 G/DL (ref 6.4–8.3)
WBC # BLD AUTO: 6.2 10E3/UL (ref 4–11)

## 2023-12-13 PROCEDURE — 84166 PROTEIN E-PHORESIS/URINE/CSF: CPT | Performed by: STUDENT IN AN ORGANIZED HEALTH CARE EDUCATION/TRAINING PROGRAM

## 2023-12-13 PROCEDURE — 84155 ASSAY OF PROTEIN SERUM: CPT

## 2023-12-13 PROCEDURE — 85025 COMPLETE CBC W/AUTO DIFF WBC: CPT

## 2023-12-13 PROCEDURE — 86147 CARDIOLIPIN ANTIBODY EA IG: CPT

## 2023-12-13 PROCEDURE — 80053 COMPREHEN METABOLIC PANEL: CPT

## 2023-12-13 PROCEDURE — 84166 PROTEIN E-PHORESIS/URINE/CSF: CPT | Mod: 26 | Performed by: STUDENT IN AN ORGANIZED HEALTH CARE EDUCATION/TRAINING PROGRAM

## 2023-12-13 PROCEDURE — 84165 PROTEIN E-PHORESIS SERUM: CPT | Mod: 26 | Performed by: STUDENT IN AN ORGANIZED HEALTH CARE EDUCATION/TRAINING PROGRAM

## 2023-12-13 PROCEDURE — 36415 COLL VENOUS BLD VENIPUNCTURE: CPT

## 2023-12-13 PROCEDURE — 82955 ASSAY OF G6PD ENZYME: CPT

## 2023-12-13 PROCEDURE — 84165 PROTEIN E-PHORESIS SERUM: CPT | Mod: TC | Performed by: STUDENT IN AN ORGANIZED HEALTH CARE EDUCATION/TRAINING PROGRAM

## 2023-12-13 PROCEDURE — 86140 C-REACTIVE PROTEIN: CPT

## 2023-12-14 LAB
ALBUMIN SERPL ELPH-MCNC: 4.3 G/DL (ref 3.7–5.1)
ALPHA1 GLOB SERPL ELPH-MCNC: 0.2 G/DL (ref 0.2–0.4)
ALPHA2 GLOB SERPL ELPH-MCNC: 0.4 G/DL (ref 0.5–0.9)
B-GLOBULIN SERPL ELPH-MCNC: 0.5 G/DL (ref 0.6–1)
CARDIOLIPIN IGG SER IA-ACNC: 2.5 GPL-U/ML
CARDIOLIPIN IGG SER IA-ACNC: NEGATIVE
CARDIOLIPIN IGM SER IA-ACNC: <2 MPL-U/ML
CARDIOLIPIN IGM SER IA-ACNC: NEGATIVE
GAMMA GLOB SERPL ELPH-MCNC: 1.1 G/DL (ref 0.7–1.6)
M PROTEIN SERPL ELPH-MCNC: 0 G/DL
PROT PATTERN SERPL ELPH-IMP: ABNORMAL
PROT PATTERN UR ELPH-IMP: NORMAL

## 2023-12-15 LAB — G6PD RBC-CCNT: 12.1 U/G HB

## 2024-01-02 ENCOUNTER — TRANSFERRED RECORDS (OUTPATIENT)
Dept: HEALTH INFORMATION MANAGEMENT | Facility: CLINIC | Age: 39
End: 2024-01-02

## 2024-01-17 ENCOUNTER — OFFICE VISIT (OUTPATIENT)
Dept: INFECTIOUS DISEASES | Facility: CLINIC | Age: 39
End: 2024-01-17
Attending: INTERNAL MEDICINE
Payer: COMMERCIAL

## 2024-01-17 ENCOUNTER — PRE VISIT (OUTPATIENT)
Dept: INFECTIOUS DISEASES | Facility: CLINIC | Age: 39
End: 2024-01-17
Payer: COMMERCIAL

## 2024-01-17 VITALS
OXYGEN SATURATION: 99 % | DIASTOLIC BLOOD PRESSURE: 72 MMHG | SYSTOLIC BLOOD PRESSURE: 110 MMHG | BODY MASS INDEX: 20.83 KG/M2 | HEART RATE: 69 BPM | WEIGHT: 133 LBS

## 2024-01-17 DIAGNOSIS — R76.12 POSITIVE QUANTIFERON-TB GOLD TEST: ICD-10-CM

## 2024-01-17 DIAGNOSIS — Z22.7 LTBI (LATENT TUBERCULOSIS INFECTION): Primary | ICD-10-CM

## 2024-01-17 PROCEDURE — 99204 OFFICE O/P NEW MOD 45 MIN: CPT | Performed by: STUDENT IN AN ORGANIZED HEALTH CARE EDUCATION/TRAINING PROGRAM

## 2024-01-17 PROCEDURE — 99213 OFFICE O/P EST LOW 20 MIN: CPT | Performed by: STUDENT IN AN ORGANIZED HEALTH CARE EDUCATION/TRAINING PROGRAM

## 2024-01-17 RX ORDER — RIFABUTIN 150 MG/1
300 CAPSULE ORAL DAILY
Qty: 240 CAPSULE | Refills: 0 | Status: SHIPPED | OUTPATIENT
Start: 2024-01-17 | End: 2024-05-16

## 2024-01-17 ASSESSMENT — PAIN SCALES - GENERAL: PAINLEVEL: NO PAIN (0)

## 2024-01-17 NOTE — PROGRESS NOTES
[facility]  Dr. Valentino Landers, [facility], [address]  [address] [place] [address]   Patient:  Halle Alcantara, Date of birth 1985, Medical record number [id number]  Date of Visit:  01/17/2024         Assessment and Recommendations:   ID Problem List  LTBI  Rheumatoid arthritis, on HCQ  Hypothyroidism    Recommendations  Start rifabutin 300mg PO qDay  Selecting this rather than rifampin to avoid HCQ interaction  LFTs reviewed and normal (12/13/23 labs)  Repeat CMP in 2 weeks  RTC in 1 month with repeat CMP    Discussion  39yo F with h/o RA (on HCQ), hypothyroidism, pernicious anemia who presents for evaluation and management of positive Quantiferon.     Given HCQ being used for RA, will opt for rifabutin x 4 months as LTBI therapy. Counseled on avoiding pregnancy while on therapy, as well as on potential adverse effects of rifabutin (LFT elevation, GI upset, body fluid staining). Pt agreeable to therapy, so prescribed today. LFTs from 12/13/23 reviewed and normal, will repeat after 2 weeks of therapy and at 4 week follow-up.    Valentino Landers MD  Division of Infectious Diseases and International Medicine  [phone]         History of Infectious Disease Illness:     39yo F with h/o RA (on HCQ), hypothyroidism, pernicious anemia who presents for evaluation and management of positive Quantiferon.    Quantiferon was checked 7/11/22 as part of routine labwork in rheumatology clinic. A chest x-ray on 7/15/22 (and a further on 8/4/23) did not show signs of active TB. No treatment was initiated for LTBI at the time of testing, and she is now referred for treatment due to possible pregnancy in the near future.    She denies any history of persistent dyspnea or cough. Similarly denies any history of hemoptysis, unintended weight loss, unexplained fever. She has had prior night sweats, though says these are rare, not persistent, and haven't  occurred recently. Only foreign travel was studying abroad in [place] in 2008 (with some travel to surrounding areas of [place]). Lived in a dormitory in college. No history of living in barracks, no prior time in custodial/FDC, no history of homelessness. No known positive TB contacts. No prior TB treatment. Has one 4.5yr old son who is healthy.         Past Medical and Surgical History:   RA, hypothyroidism, pernicious anemia        Family History:     Family History   Problem Relation Age of Onset    Thyroid Disease Mother     Rheumatoid Arthritis Father     Diabetes Type 1 Sister     Thyroid Disease Sister          Social History:     Social History     Tobacco Use    Smoking status: Never    Smokeless tobacco: Never   Vaping Use    Vaping Use: Never used   Substance Use Topics    Alcohol use: Not Currently    Drug use: Yes     Social History     Social History Narrative    Not on file          Review of Systems:   CONSTITUTIONAL:  No fevers or chills  EYES: negative for icterus  ENT:  negative for hearing loss, tinnitus, sore throat  RESPIRATORY:  negative for cough, sputum or dyspnea  CARDIOVASCULAR:  negative for chest pain, palpitations  GASTROINTESTINAL:  negative for nausea, vomiting, diarrhea or constipation  GENITOURINARY:  negative for dysuria  HEME:  No easy bruising  INTEGUMENT:  negative for rash or pruritus  NEURO:  Negative for headache         Current Medications:     Current Outpatient Medications   Medication Sig Dispense Refill    hydroxychloroquine (PLAQUENIL) 200 MG tablet Take 1 tablet (200 mg) by mouth daily Annual Plaquenil toxicity eye screening required. 90 tablet 3    levothyroxine (SYNTHROID/LEVOTHROID) 112 MCG tablet Take 1 Tablet by mouth daily.      rifabutin (MYCOBUTIN) 150 MG capsule Take 2 capsules (300 mg) by mouth daily for 120 days 240 capsule 0    Prenatal MV-Min-Fe Fum-FA-DHA (PRENATAL MULTIVITAMIN + DHA PO)  (Patient not taking: Reported on 1/17/2024)             Immunization History:     Immunization History   Administered Date(s) Administered    COVID-19 MONOVALENT 12+ (Pfizer) 04/21/2021, 05/11/2021    COVID-19 Monovalent 12+ (Pfizer 2022) 12/17/2021    HPV Quadrivalent 06/11/2007, 08/13/2007, 08/19/2008    Influenza Vaccine >6 months,quad, PF 02/25/2019    Influenza,INJ,MDCK,PF,Quad >6mo(Flucelvax) 11/08/2019    Rhogam 06/04/2019, 09/04/2019    TDAP (Adacel,Boostrix) 08/19/2008, 09/26/2017, 06/20/2019    Td (Adult), Adsorbed 01/01/1999          Allergies:     Allergies   Allergen Reactions    Cephalosporins           Physical Exam:   Vital signs:  /72 (BP Location: Right arm, Patient Position: Sitting, Cuff Size: Adult Regular)   Pulse 69   Wt 60.3 kg (133 lb)   SpO2 99%   BMI 20.83 kg/m      Physical Examination:  GENERAL:  well-developed, well-nourished, seated in no acute distress.  HEENT:  Head is normocephalic, atraumatic   EYES:  Eyes have anicteric sclerae without conjunctival injection   ENT:  Oropharynx is moist without exudates or ulcers. Tongue is midline  LUNGS:  Breathing easily on room air  ABDOMEN:  Nondistended  SKIN:  No acute rashes.    NEUROLOGIC:  Grossly nonfocal. Active x4 extremities         Laboratory Data:     Metabolic Studies   Sodium   Date Value Ref Range Status   12/13/2023 140 135 - 145 mmol/L Final     Comment:     Reference intervals for this test were updated on 09/26/2023 to more accurately reflect our healthy population. There may be differences in the flagging of prior results with similar values performed with this method. Interpretation of those prior results can be made in the context of the updated reference intervals.    08/04/2023 138 136 - 145 mmol/L Final     Potassium   Date Value Ref Range Status   12/13/2023 4.1 3.4 - 5.3 mmol/L Final   08/04/2023 4.0 3.4 - 5.3 mmol/L Final     Chloride   Date Value Ref Range Status   12/13/2023 104 98 - 107 mmol/L Final   08/04/2023 103 98 - 107 mmol/L Final     Carbon Dioxide  (CO2)   Date Value Ref Range Status   12/13/2023 28 22 - 29 mmol/L Final   08/04/2023 25 22 - 29 mmol/L Final     Anion Gap   Date Value Ref Range Status   12/13/2023 8 7 - 15 mmol/L Final   08/04/2023 10 7 - 15 mmol/L Final     Urea Nitrogen   Date Value Ref Range Status   12/13/2023 10.8 6.0 - 20.0 mg/dL Final   08/04/2023 8.4 6.0 - 20.0 mg/dL Final     Creatinine   Date Value Ref Range Status   12/13/2023 0.88 0.51 - 0.95 mg/dL Final   08/04/2023 0.78 0.51 - 0.95 mg/dL Final     GFR Estimate   Date Value Ref Range Status   12/13/2023 86 >60 mL/min/1.73m2 Final   08/04/2023 >90 >60 mL/min/1.73m2 Final     Glucose   Date Value Ref Range Status   12/13/2023 78 70 - 99 mg/dL Final   08/04/2023 111 (H) 70 - 99 mg/dL Final     Calcium   Date Value Ref Range Status   12/13/2023 8.8 8.6 - 10.0 mg/dL Final   08/04/2023 8.7 8.6 - 10.0 mg/dL Final     CRP Inflammation   Date Value Ref Range Status   12/28/2022 <2.9 0.0 - 8.0 mg/L Final     Inflammatory Markers   CRP Inflammation   Date Value Ref Range Status   12/28/2022 <2.9 0.0 - 8.0 mg/L Final     Hepatic Studies    Bilirubin Total   Date Value Ref Range Status   12/13/2023 0.6 <=1.2 mg/dL Final   08/04/2023 0.2 <=1.2 mg/dL Final     Alkaline Phosphatase   Date Value Ref Range Status   12/13/2023 44 40 - 150 U/L Final     Comment:     Reference intervals for this test were updated on 11/14/2023 to more accurately reflect our healthy population. There may be differences in the flagging of prior results with similar values performed with this method. Interpretation of those prior results can be made in the context of the updated reference intervals.   08/04/2023 45 35 - 104 U/L Final     Albumin   Date Value Ref Range Status   12/13/2023 4.4 3.5 - 5.2 g/dL Final   08/04/2023 4.4 3.5 - 5.2 g/dL Final   12/28/2022 4.0 3.4 - 5.0 g/dL Final     AST   Date Value Ref Range Status   12/13/2023 18 0 - 45 U/L Final     Comment:     Reference intervals for this test were updated on  6/12/2023 to more accurately reflect our healthy population. There may be differences in the flagging of prior results with similar values performed with this method. Interpretation of those prior results can be made in the context of the updated reference intervals.   08/04/2023 16 0 - 45 U/L Final     Comment:     Reference intervals for this test were updated on 6/12/2023 to more accurately reflect our healthy population. There may be differences in the flagging of prior results with similar values performed with this method. Interpretation of those prior results can be made in the context of the updated reference intervals.     ALT   Date Value Ref Range Status   12/13/2023 13 0 - 50 U/L Final     Comment:     Reference intervals for this test were updated on 6/12/2023 to more accurately reflect our healthy population. There may be differences in the flagging of prior results with similar values performed with this method. Interpretation of those prior results can be made in the context of the updated reference intervals.     08/04/2023 13 0 - 50 U/L Final     Comment:     Reference intervals for this test were updated on 6/12/2023 to more accurately reflect our healthy population. There may be differences in the flagging of prior results with similar values performed with this method. Interpretation of those prior results can be made in the context of the updated reference intervals.       Hematology Studies      WBC Count   Date Value Ref Range Status   12/13/2023 6.2 4.0 - 11.0 10e3/uL Final   08/04/2023 7.1 4.0 - 11.0 10e3/uL Final     Hemoglobin   Date Value Ref Range Status   12/13/2023 13.1 11.7 - 15.7 g/dL Final   08/04/2023 12.7 11.7 - 15.7 g/dL Final     Hematocrit   Date Value Ref Range Status   12/13/2023 40.2 35.0 - 47.0 % Final   08/04/2023 39.2 35.0 - 47.0 % Final     Platelet Count   Date Value Ref Range Status   12/13/2023 151 150 - 450 10e3/uL Final   08/04/2023 158 150 - 450 10e3/uL Final      Imaging:  CXR 8/4/23  IMPRESSION: Hyperinflation both lungs. The lungs are otherwise clear. No pleural effusions. Heart size and pulmonary vascularity are within normal limits.    Never

## 2024-01-17 NOTE — NURSING NOTE
Chief Complaint   Patient presents with    Consult     Per Pt, Referred for Positive QuantiFERON-TB Gold test by Lynn Arteaga, per encounter 12/12/2023  Complete 1.14.24         /72 (BP Location: Right arm, Patient Position: Sitting, Cuff Size: Adult Regular)   Pulse 69   Wt 60.3 kg (133 lb)   SpO2 99%   BMI 20.83 kg/m    Lauryn Albert [facility]

## 2024-01-17 NOTE — LETTER
1/17/2024       RE: Halle Alcantara  [address]  [facility] [address]     Dear Colleague,    Thank you for referring your patient, Halle Alcantara, to the [facility] INFECTIOUS DISEASE CLINIC [place] at [facility]. Please see a copy of my visit note below.     [facility]  Dr. Valentino Landers, [facility], Floor 3  [address] [place] [address]   Patient:  Halle Alcantara, Date of birth 1985, Medical record number [id number]  Date of Visit:  01/17/2024         Assessment and Recommendations:   ID Problem List  LTBI  Rheumatoid arthritis, on HCQ  Hypothyroidism    Recommendations  Start rifabutin 300mg PO qDay  Selecting this rather than rifampin to avoid HCQ interaction  LFTs reviewed and normal (12/13/23 labs)  Repeat CMP in 2 weeks  RTC in 1 month with repeat CMP    Discussion  39yo F with h/o RA (on HCQ), hypothyroidism, pernicious anemia who presents for evaluation and management of positive Quantiferon.     Given HCQ being used for RA, will opt for rifabutin x 4 months as LTBI therapy. Counseled on avoiding pregnancy while on therapy, as well as on potential adverse effects of rifabutin (LFT elevation, GI upset, body fluid staining). Pt agreeable to therapy, so prescribed today. LFTs from 12/13/23 reviewed and normal, will repeat after 2 weeks of therapy and at 4 week follow-up.    Valentino Landers MD  Division of Infectious Diseases and International Medicine  [phone]         History of Infectious Disease Illness:     39yo F with h/o RA (on HCQ), hypothyroidism, pernicious anemia who presents for evaluation and management of positive Quantiferon.    Quantiferon was checked 7/11/22 as part of routine labwork in rheumatology clinic. A chest x-ray on 7/15/22 (and a further on 8/4/23) did not show signs of active TB. No treatment was initiated for LTBI at  the time of testing, and she is now referred for treatment due to possible pregnancy in the near future.    She denies any history of persistent dyspnea or cough. Similarly denies any history of hemoptysis, unintended weight loss, unexplained fever. She has had prior night sweats, though says these are rare, not persistent, and haven't occurred recently. Only foreign travel was studying abroad in [place] in 2008 (with some travel to surrounding areas of [place]). Lived in a dormitory in college. No history of living in barracks, no prior time in long-term/FCI, no history of homelessness. No known positive TB contacts. No prior TB treatment. Has one 4.5yr old son who is healthy.         Past Medical and Surgical History:   RA, hypothyroidism, pernicious anemia        Family History:     Family History   Problem Relation Age of Onset    Thyroid Disease Mother     Rheumatoid Arthritis Father     Diabetes Type 1 Sister     Thyroid Disease Sister          Social History:     Social History     Tobacco Use    Smoking status: Never    Smokeless tobacco: Never   Vaping Use    Vaping Use: Never used   Substance Use Topics    Alcohol use: Not Currently    Drug use: Yes     Social History     Social History Narrative    Not on file          Review of Systems:   CONSTITUTIONAL:  No fevers or chills  EYES: negative for icterus  ENT:  negative for hearing loss, tinnitus, sore throat  RESPIRATORY:  negative for cough, sputum or dyspnea  CARDIOVASCULAR:  negative for chest pain, palpitations  GASTROINTESTINAL:  negative for nausea, vomiting, diarrhea or constipation  GENITOURINARY:  negative for dysuria  HEME:  No easy bruising  INTEGUMENT:  negative for rash or pruritus  NEURO:  Negative for headache         Current Medications:     Current Outpatient Medications   Medication Sig Dispense Refill    hydroxychloroquine (PLAQUENIL) 200 MG tablet Take 1 tablet (200 mg) by mouth daily Annual Plaquenil toxicity eye screening required.  90 tablet 3    levothyroxine (SYNTHROID/LEVOTHROID) 112 MCG tablet Take 1 Tablet by mouth daily.      rifabutin (MYCOBUTIN) 150 MG capsule Take 2 capsules (300 mg) by mouth daily for 120 days 240 capsule 0    Prenatal MV-Min-Fe Fum-FA-DHA (PRENATAL MULTIVITAMIN + DHA PO)  (Patient not taking: Reported on 1/17/2024)            Immunization History:     Immunization History   Administered Date(s) Administered    COVID-19 MONOVALENT 12+ (Pfizer) 04/21/2021, 05/11/2021    COVID-19 Monovalent 12+ (Pfizer 2022) 12/17/2021    HPV Quadrivalent 06/11/2007, 08/13/2007, 08/19/2008    Influenza Vaccine >6 months,quad, PF 02/25/2019    Influenza,INJ,MDCK,PF,Quad >6mo(Flucelvax) 11/08/2019    Rhogam 06/04/2019, 09/04/2019    TDAP (Adacel,Boostrix) 08/19/2008, 09/26/2017, 06/20/2019    Td (Adult), Adsorbed 01/01/1999          Allergies:     Allergies   Allergen Reactions    Cephalosporins           Physical Exam:   Vital signs:  /72 (BP Location: Right arm, Patient Position: Sitting, Cuff Size: Adult Regular)   Pulse 69   Wt 60.3 kg (133 lb)   SpO2 99%   BMI 20.83 kg/m      Physical Examination:  GENERAL:  well-developed, well-nourished, seated in no acute distress.  HEENT:  Head is normocephalic, atraumatic   EYES:  Eyes have anicteric sclerae without conjunctival injection   ENT:  Oropharynx is moist without exudates or ulcers. Tongue is midline  LUNGS:  Breathing easily on room air  ABDOMEN:  Nondistended  SKIN:  No acute rashes.    NEUROLOGIC:  Grossly nonfocal. Active x4 extremities         Laboratory Data:     Metabolic Studies   Sodium   Date Value Ref Range Status   12/13/2023 140 135 - 145 mmol/L Final     Comment:     Reference intervals for this test were updated on 09/26/2023 to more accurately reflect our healthy population. There may be differences in the flagging of prior results with similar values performed with this method. Interpretation of those prior results can be made in the context of the updated  reference intervals.    08/04/2023 138 136 - 145 mmol/L Final     Potassium   Date Value Ref Range Status   12/13/2023 4.1 3.4 - 5.3 mmol/L Final   08/04/2023 4.0 3.4 - 5.3 mmol/L Final     Chloride   Date Value Ref Range Status   12/13/2023 104 98 - 107 mmol/L Final   08/04/2023 103 98 - 107 mmol/L Final     Carbon Dioxide (CO2)   Date Value Ref Range Status   12/13/2023 28 22 - 29 mmol/L Final   08/04/2023 25 22 - 29 mmol/L Final     Anion Gap   Date Value Ref Range Status   12/13/2023 8 7 - 15 mmol/L Final   08/04/2023 10 7 - 15 mmol/L Final     Urea Nitrogen   Date Value Ref Range Status   12/13/2023 10.8 6.0 - 20.0 mg/dL Final   08/04/2023 8.4 6.0 - 20.0 mg/dL Final     Creatinine   Date Value Ref Range Status   12/13/2023 0.88 0.51 - 0.95 mg/dL Final   08/04/2023 0.78 0.51 - 0.95 mg/dL Final     GFR Estimate   Date Value Ref Range Status   12/13/2023 86 >60 mL/min/1.73m2 Final   08/04/2023 >90 >60 mL/min/1.73m2 Final     Glucose   Date Value Ref Range Status   12/13/2023 78 70 - 99 mg/dL Final   08/04/2023 111 (H) 70 - 99 mg/dL Final     Calcium   Date Value Ref Range Status   12/13/2023 8.8 8.6 - 10.0 mg/dL Final   08/04/2023 8.7 8.6 - 10.0 mg/dL Final     CRP Inflammation   Date Value Ref Range Status   12/28/2022 <2.9 0.0 - 8.0 mg/L Final     Inflammatory Markers   CRP Inflammation   Date Value Ref Range Status   12/28/2022 <2.9 0.0 - 8.0 mg/L Final     Hepatic Studies    Bilirubin Total   Date Value Ref Range Status   12/13/2023 0.6 <=1.2 mg/dL Final   08/04/2023 0.2 <=1.2 mg/dL Final     Alkaline Phosphatase   Date Value Ref Range Status   12/13/2023 44 40 - 150 U/L Final     Comment:     Reference intervals for this test were updated on 11/14/2023 to more accurately reflect our healthy population. There may be differences in the flagging of prior results with similar values performed with this method. Interpretation of those prior results can be made in the context of the updated reference intervals.    08/04/2023 45 35 - 104 U/L Final     Albumin   Date Value Ref Range Status   12/13/2023 4.4 3.5 - 5.2 g/dL Final   08/04/2023 4.4 3.5 - 5.2 g/dL Final   12/28/2022 4.0 3.4 - 5.0 g/dL Final     AST   Date Value Ref Range Status   12/13/2023 18 0 - 45 U/L Final     Comment:     Reference intervals for this test were updated on 6/12/2023 to more accurately reflect our healthy population. There may be differences in the flagging of prior results with similar values performed with this method. Interpretation of those prior results can be made in the context of the updated reference intervals.   08/04/2023 16 0 - 45 U/L Final     Comment:     Reference intervals for this test were updated on 6/12/2023 to more accurately reflect our healthy population. There may be differences in the flagging of prior results with similar values performed with this method. Interpretation of those prior results can be made in the context of the updated reference intervals.     ALT   Date Value Ref Range Status   12/13/2023 13 0 - 50 U/L Final     Comment:     Reference intervals for this test were updated on 6/12/2023 to more accurately reflect our healthy population. There may be differences in the flagging of prior results with similar values performed with this method. Interpretation of those prior results can be made in the context of the updated reference intervals.     08/04/2023 13 0 - 50 U/L Final     Comment:     Reference intervals for this test were updated on 6/12/2023 to more accurately reflect our healthy population. There may be differences in the flagging of prior results with similar values performed with this method. Interpretation of those prior results can be made in the context of the updated reference intervals.       Hematology Studies      WBC Count   Date Value Ref Range Status   12/13/2023 6.2 4.0 - 11.0 10e3/uL Final   08/04/2023 7.1 4.0 - 11.0 10e3/uL Final     Hemoglobin   Date Value Ref Range Status    12/13/2023 13.1 11.7 - 15.7 g/dL Final   08/04/2023 12.7 11.7 - 15.7 g/dL Final     Hematocrit   Date Value Ref Range Status   12/13/2023 40.2 35.0 - 47.0 % Final   08/04/2023 39.2 35.0 - 47.0 % Final     Platelet Count   Date Value Ref Range Status   12/13/2023 151 150 - 450 10e3/uL Final   08/04/2023 158 150 - 450 10e3/uL Final     Imaging:  CXR 8/4/23  IMPRESSION: Hyperinflation both lungs. The lungs are otherwise clear. No pleural effusions. Heart size and pulmonary vascularity are within normal limits.       Valentino Landers MD

## 2024-02-10 ENCOUNTER — LAB (OUTPATIENT)
Dept: LAB | Facility: CLINIC | Age: 39
End: 2024-02-10
Payer: COMMERCIAL

## 2024-02-10 DIAGNOSIS — R76.12 POSITIVE QUANTIFERON-TB GOLD TEST: ICD-10-CM

## 2024-02-10 LAB
ALBUMIN SERPL BCG-MCNC: 4.1 G/DL (ref 3.5–5.2)
ALP SERPL-CCNC: 41 U/L (ref 40–150)
ALT SERPL W P-5'-P-CCNC: 15 U/L (ref 0–50)
ANION GAP SERPL CALCULATED.3IONS-SCNC: 8 MMOL/L (ref 7–15)
AST SERPL W P-5'-P-CCNC: 29 U/L (ref 0–45)
BILIRUB SERPL-MCNC: 0.3 MG/DL
BUN SERPL-MCNC: 6.5 MG/DL (ref 6–20)
CALCIUM SERPL-MCNC: 8.8 MG/DL (ref 8.6–10)
CHLORIDE SERPL-SCNC: 106 MMOL/L (ref 98–107)
CREAT SERPL-MCNC: 0.74 MG/DL (ref 0.51–0.95)
DEPRECATED HCO3 PLAS-SCNC: 28 MMOL/L (ref 22–29)
EGFRCR SERPLBLD CKD-EPI 2021: >90 ML/MIN/1.73M2
GLUCOSE SERPL-MCNC: 76 MG/DL (ref 70–99)
POTASSIUM SERPL-SCNC: 4.6 MMOL/L (ref 3.4–5.3)
PROT SERPL-MCNC: 6.8 G/DL (ref 6.4–8.3)
SODIUM SERPL-SCNC: 142 MMOL/L (ref 135–145)

## 2024-02-10 PROCEDURE — 80053 COMPREHEN METABOLIC PANEL: CPT

## 2024-02-10 PROCEDURE — 36415 COLL VENOUS BLD VENIPUNCTURE: CPT

## 2024-02-21 ENCOUNTER — TELEPHONE (OUTPATIENT)
Dept: RHEUMATOLOGY | Facility: CLINIC | Age: 39
End: 2024-02-21
Payer: COMMERCIAL

## 2024-02-21 NOTE — TELEPHONE ENCOUNTER
Plaquenil eye report received by fax.   Plaquenil flow sheet updated, copy sent to abstract for scanning.     JASON Chaudhary   Email: [email or web address]  [facility] - Rheumatology  Phone: [phone]  Fax: [phone]

## 2024-03-06 ENCOUNTER — OFFICE VISIT (OUTPATIENT)
Dept: INFECTIOUS DISEASES | Facility: CLINIC | Age: 39
End: 2024-03-06
Attending: STUDENT IN AN ORGANIZED HEALTH CARE EDUCATION/TRAINING PROGRAM
Payer: COMMERCIAL

## 2024-03-06 VITALS
HEART RATE: 72 BPM | HEIGHT: 67 IN | BODY MASS INDEX: 20.72 KG/M2 | DIASTOLIC BLOOD PRESSURE: 70 MMHG | OXYGEN SATURATION: 96 % | WEIGHT: 132 LBS | TEMPERATURE: 98.3 F | SYSTOLIC BLOOD PRESSURE: 104 MMHG

## 2024-03-06 DIAGNOSIS — Z22.7 LTBI (LATENT TUBERCULOSIS INFECTION): Primary | ICD-10-CM

## 2024-03-06 PROCEDURE — 99215 OFFICE O/P EST HI 40 MIN: CPT | Performed by: STUDENT IN AN ORGANIZED HEALTH CARE EDUCATION/TRAINING PROGRAM

## 2024-03-06 PROCEDURE — 99213 OFFICE O/P EST LOW 20 MIN: CPT | Performed by: STUDENT IN AN ORGANIZED HEALTH CARE EDUCATION/TRAINING PROGRAM

## 2024-03-06 ASSESSMENT — PAIN SCALES - GENERAL: PAINLEVEL: NO PAIN (0)

## 2024-03-06 NOTE — LETTER
3/6/2024       RE: Halle Alcantara  [address]  [facility] [address]     Dear Colleague,    Thank you for referring your patient, Halle Alcantara, to the [facility] INFECTIOUS DISEASE CLINIC [place] at [facility]. Please see a copy of my visit note below.     [facility]  Dr. Valentino Landers, [facility], Floor 3  [address] [place] [address]   Patient:  Halle Alcantara, Date of birth 1985, Medical record number [id number]  Date of Visit:  03/05/2024         Assessment and Recommendations:   ID Problem List  LTBI  Rheumatoid arthritis, on HCQ  Hypothyroidism    Recommendations  Continue rifabutin 300mg PO qDay, end date 5/16/24  Selected this rather than rifampin to avoid HCQ interaction  LFTs reviewed and normal (2/10/24 labs)  Repeat CMP today and monthly through 5/16/24  RTC in 2 months (a little before ending therapy on 5/16/24)    Discussion  39yo F with h/o RA (on HCQ), hypothyroidism, pernicious anemia who presents for evaluation and management of positive Quantiferon.     Given HCQ being used for RA, have opted for rifabutin x 4 months as LTBI therapy. Counseled on avoiding pregnancy while on therapy, as well as on potential adverse effects of rifabutin (LFT elevation, GI upset, body fluid staining). Pt agreeable to therapy, and started on 1/17/24, with end date planned for 5/16/24. LFTs from 2/10/24 reviewed and normal, will repeat now and monthly through end of therapy.    Valentino Landers MD  Division of Infectious Diseases and International Medicine  [phone]    I spent at least 40 minutes on the day of this encounter on chart review, patient exam/interview, and note preparation.         History of Infectious Disease Illness:     39yo F with h/o RA (on HCQ), hypothyroidism, pernicious anemia who presents for evaluation and management of positive  Quantiferon.    Quantiferon was checked 7/11/22 as part of routine labwork in rheumatology clinic. A chest x-ray on 7/15/22 (and a further on 8/4/23) did not show signs of active TB. No treatment was initiated for LTBI at the time of testing, and she is now referred for treatment due to possible pregnancy in the near future.    She denies any history of persistent dyspnea or cough. Similarly denies any history of hemoptysis, unintended weight loss, unexplained fever. She has had prior night sweats, though says these are rare, not persistent, and haven't occurred recently. Only foreign travel was studying abroad in [place] in 2008 (with some travel to surrounding areas of [place]). Lived in a dormitory in college. No history of living in barracks, no prior time in retirement/prison, no history of homelessness. No known positive TB contacts. No prior TB treatment. Has one 4.5yr old son who is healthy.         Past Medical and Surgical History:   RA, hypothyroidism, pernicious anemia        Family History:     Family History   Problem Relation Age of Onset    Thyroid Disease Mother     Rheumatoid Arthritis Father     Diabetes Type 1 Sister     Thyroid Disease Sister          Social History:     Social History     Tobacco Use    Smoking status: Never    Smokeless tobacco: Never   Vaping Use    Vaping Use: Never used   Substance Use Topics    Alcohol use: Not Currently    Drug use: Yes     Social History     Social History Narrative    Not on file          Review of Systems:   CONSTITUTIONAL:  No fevers or chills  EYES: negative for icterus  ENT:  negative for hearing loss, tinnitus, sore throat  RESPIRATORY:  negative for cough, sputum or dyspnea  CARDIOVASCULAR:  negative for chest pain, palpitations  GASTROINTESTINAL:  negative for nausea, vomiting, diarrhea or constipation  GENITOURINARY:  negative for dysuria  HEME:  No easy bruising  INTEGUMENT:  negative for rash or pruritus  NEURO:  Negative for headache          Current Medications:     Current Outpatient Medications   Medication Sig Dispense Refill    hydroxychloroquine (PLAQUENIL) 200 MG tablet Take 1 tablet (200 mg) by mouth daily Annual Plaquenil toxicity eye screening required. 90 tablet 3    levothyroxine (SYNTHROID/LEVOTHROID) 112 MCG tablet Take 1 Tablet by mouth daily.      Prenatal MV-Min-Fe Fum-FA-DHA (PRENATAL MULTIVITAMIN + DHA PO)  (Patient not taking: Reported on 1/17/2024)      rifabutin (MYCOBUTIN) 150 MG capsule Take 2 capsules (300 mg) by mouth daily for 120 days 240 capsule 0          Immunization History:     Immunization History   Administered Date(s) Administered    COVID-19 MONOVALENT 12+ (Pfizer) 04/21/2021, 05/11/2021    COVID-19 Monovalent 12+ (Pfizer 2022) 12/17/2021    HPV Quadrivalent 06/11/2007, 08/13/2007, 08/19/2008    Influenza Vaccine >6 months,quad, PF 02/25/2019    Influenza,INJ,MDCK,PF,Quad >6mo(Flucelvax) 11/08/2019    Rhogam 06/04/2019, 09/04/2019    TDAP (Adacel,Boostrix) 08/19/2008, 09/26/2017, 06/20/2019    Td (Adult), Adsorbed 01/01/1999          Allergies:     Allergies   Allergen Reactions    Cephalosporins           Physical Exam:   Vital signs:  There were no vitals taken for this visit.    Physical Examination:  GENERAL:  well-developed, well-nourished, seated in no acute distress.  HEENT:  Head is normocephalic, atraumatic   EYES:  Eyes have anicteric sclerae without conjunctival injection   ENT:  Oropharynx is moist without exudates or ulcers. Tongue is midline  LUNGS:  Breathing easily on room air  ABDOMEN:  Nondistended  SKIN:  No acute rashes.    NEUROLOGIC:  Grossly nonfocal. Active x4 extremities         Laboratory Data:     Metabolic Studies   Sodium   Date Value Ref Range Status   02/10/2024 142 135 - 145 mmol/L Final     Comment:     Reference intervals for this test were updated on 09/26/2023 to more accurately reflect our healthy population. There may be differences in the flagging of prior results with similar  values performed with this method. Interpretation of those prior results can be made in the context of the updated reference intervals.    12/13/2023 140 135 - 145 mmol/L Final     Comment:     Reference intervals for this test were updated on 09/26/2023 to more accurately reflect our healthy population. There may be differences in the flagging of prior results with similar values performed with this method. Interpretation of those prior results can be made in the context of the updated reference intervals.      Potassium   Date Value Ref Range Status   02/10/2024 4.6 3.4 - 5.3 mmol/L Final   12/13/2023 4.1 3.4 - 5.3 mmol/L Final     Chloride   Date Value Ref Range Status   02/10/2024 106 98 - 107 mmol/L Final   12/13/2023 104 98 - 107 mmol/L Final     Carbon Dioxide (CO2)   Date Value Ref Range Status   02/10/2024 28 22 - 29 mmol/L Final   12/13/2023 28 22 - 29 mmol/L Final     Anion Gap   Date Value Ref Range Status   02/10/2024 8 7 - 15 mmol/L Final   12/13/2023 8 7 - 15 mmol/L Final     Urea Nitrogen   Date Value Ref Range Status   02/10/2024 6.5 6.0 - 20.0 mg/dL Final   12/13/2023 10.8 6.0 - 20.0 mg/dL Final     Creatinine   Date Value Ref Range Status   02/10/2024 0.74 0.51 - 0.95 mg/dL Final   12/13/2023 0.88 0.51 - 0.95 mg/dL Final     GFR Estimate   Date Value Ref Range Status   02/10/2024 >90 >60 mL/min/1.73m2 Final   12/13/2023 86 >60 mL/min/1.73m2 Final     Glucose   Date Value Ref Range Status   02/10/2024 76 70 - 99 mg/dL Final   12/13/2023 78 70 - 99 mg/dL Final     Calcium   Date Value Ref Range Status   02/10/2024 8.8 8.6 - 10.0 mg/dL Final   12/13/2023 8.8 8.6 - 10.0 mg/dL Final     CRP Inflammation   Date Value Ref Range Status   12/28/2022 <2.9 0.0 - 8.0 mg/L Final     Inflammatory Markers   CRP Inflammation   Date Value Ref Range Status   12/28/2022 <2.9 0.0 - 8.0 mg/L Final     Hepatic Studies    Bilirubin Total   Date Value Ref Range Status   02/10/2024 0.3 <=1.2 mg/dL Final   12/13/2023 0.6  <=1.2 mg/dL Final     Alkaline Phosphatase   Date Value Ref Range Status   02/10/2024 41 40 - 150 U/L Final     Comment:     Reference intervals for this test were updated on 11/14/2023 to more accurately reflect our healthy population. There may be differences in the flagging of prior results with similar values performed with this method. Interpretation of those prior results can be made in the context of the updated reference intervals.   12/13/2023 44 40 - 150 U/L Final     Comment:     Reference intervals for this test were updated on 11/14/2023 to more accurately reflect our healthy population. There may be differences in the flagging of prior results with similar values performed with this method. Interpretation of those prior results can be made in the context of the updated reference intervals.     Albumin   Date Value Ref Range Status   02/10/2024 4.1 3.5 - 5.2 g/dL Final   12/13/2023 4.4 3.5 - 5.2 g/dL Final   12/28/2022 4.0 3.4 - 5.0 g/dL Final     AST   Date Value Ref Range Status   02/10/2024 29 0 - 45 U/L Final     Comment:     Reference intervals for this test were updated on 6/12/2023 to more accurately reflect our healthy population. There may be differences in the flagging of prior results with similar values performed with this method. Interpretation of those prior results can be made in the context of the updated reference intervals.   12/13/2023 18 0 - 45 U/L Final     Comment:     Reference intervals for this test were updated on 6/12/2023 to more accurately reflect our healthy population. There may be differences in the flagging of prior results with similar values performed with this method. Interpretation of those prior results can be made in the context of the updated reference intervals.     ALT   Date Value Ref Range Status   02/10/2024 15 0 - 50 U/L Final     Comment:     Reference intervals for this test were updated on 6/12/2023 to more accurately reflect our healthy population. There  may be differences in the flagging of prior results with similar values performed with this method. Interpretation of those prior results can be made in the context of the updated reference intervals.     12/13/2023 13 0 - 50 U/L Final     Comment:     Reference intervals for this test were updated on 6/12/2023 to more accurately reflect our healthy population. There may be differences in the flagging of prior results with similar values performed with this method. Interpretation of those prior results can be made in the context of the updated reference intervals.       Hematology Studies      WBC Count   Date Value Ref Range Status   12/13/2023 6.2 4.0 - 11.0 10e3/uL Final   08/04/2023 7.1 4.0 - 11.0 10e3/uL Final     Hemoglobin   Date Value Ref Range Status   12/13/2023 13.1 11.7 - 15.7 g/dL Final   08/04/2023 12.7 11.7 - 15.7 g/dL Final     Hematocrit   Date Value Ref Range Status   12/13/2023 40.2 35.0 - 47.0 % Final   08/04/2023 39.2 35.0 - 47.0 % Final     Platelet Count   Date Value Ref Range Status   12/13/2023 151 150 - 450 10e3/uL Final   08/04/2023 158 150 - 450 10e3/uL Final     Imaging:  CXR 8/4/23  IMPRESSION: Hyperinflation both lungs. The lungs are otherwise clear. No pleural effusions. Heart size and pulmonary vascularity are within normal limits.       Again, thank you for allowing me to participate in the care of your patient.      Sincerely,    Valentino Landers MD

## 2024-03-06 NOTE — PROGRESS NOTES
[facility]  Dr. Valentino Landers, [facility], Floor 3  [address] [place] [address]   Patient:  Halle Alcantara, Date of birth 1985, Medical record number [id number]  Date of Visit:  03/05/2024         Assessment and Recommendations:   ID Problem List  LTBI  Rheumatoid arthritis, on HCQ  Hypothyroidism    Recommendations  Continue rifabutin 300mg PO qDay, end date 5/16/24  Selected this rather than rifampin to avoid HCQ interaction  LFTs reviewed and normal (2/10/24 labs)  Repeat CMP today and monthly through 5/16/24  RTC in 2 months (a little before ending therapy on 5/16/24)    Discussion  37yo F with h/o RA (on HCQ), hypothyroidism, pernicious anemia who presents for evaluation and management of positive Quantiferon.     Given HCQ being used for RA, have opted for rifabutin x 4 months as LTBI therapy. Counseled on avoiding pregnancy while on therapy, as well as on potential adverse effects of rifabutin (LFT elevation, GI upset, body fluid staining). Pt agreeable to therapy, and started on 1/17/24, with end date planned for 5/16/24. LFTs from 2/10/24 reviewed and normal, will repeat now and monthly through end of therapy.    Valentino Landers MD  Division of Infectious Diseases and International Medicine  [phone]    I spent at least 40 minutes on the day of this encounter on chart review, patient exam/interview, and note preparation.         History of Infectious Disease Illness:     37yo F with h/o RA (on HCQ), hypothyroidism, pernicious anemia who presents for evaluation and management of positive Quantiferon.    Quantiferon was checked 7/11/22 as part of routine labwork in rheumatology clinic. A chest x-ray on 7/15/22 (and a further on 8/4/23) did not show signs of active TB. No treatment was initiated for LTBI at the time of testing, and she is now referred for treatment due to possible pregnancy in the near future.    She denies any  history of persistent dyspnea or cough. Similarly denies any history of hemoptysis, unintended weight loss, unexplained fever. She has had prior night sweats, though says these are rare, not persistent, and haven't occurred recently. Only foreign travel was studying abroad in [place] in 2008 (with some travel to surrounding areas of [place]). Lived in a dormitory in college. No history of living in barracks, no prior time in [facility], no history of homelessness. No known positive TB contacts. No prior TB treatment. Has one 4.5yr old son who is healthy.         Past Medical and Surgical History:   RA, hypothyroidism, pernicious anemia        Family History:     Family History   Problem Relation Age of Onset    Thyroid Disease Mother     Rheumatoid Arthritis Father     Diabetes Type 1 Sister     Thyroid Disease Sister          Social History:     Social History     Tobacco Use    Smoking status: Never    Smokeless tobacco: Never   Vaping Use    Vaping Use: Never used   Substance Use Topics    Alcohol use: Not Currently    Drug use: Yes     Social History     Social History Narrative    Not on file          Review of Systems:   CONSTITUTIONAL:  No fevers or chills  EYES: negative for icterus  ENT:  negative for hearing loss, tinnitus, sore throat  RESPIRATORY:  negative for cough, sputum or dyspnea  CARDIOVASCULAR:  negative for chest pain, palpitations  GASTROINTESTINAL:  negative for nausea, vomiting, diarrhea or constipation  GENITOURINARY:  negative for dysuria  HEME:  No easy bruising  INTEGUMENT:  negative for rash or pruritus  NEURO:  Negative for headache         Current Medications:     Current Outpatient Medications   Medication Sig Dispense Refill    hydroxychloroquine (PLAQUENIL) 200 MG tablet Take 1 tablet (200 mg) by mouth daily Annual Plaquenil toxicity eye screening required. 90 tablet 3    levothyroxine (SYNTHROID/LEVOTHROID) 112 MCG tablet Take 1 Tablet by mouth daily.      Prenatal MV-Min-Fe  Fum-FA-DHA (PRENATAL MULTIVITAMIN + DHA PO)  (Patient not taking: Reported on 1/17/2024)      rifabutin (MYCOBUTIN) 150 MG capsule Take 2 capsules (300 mg) by mouth daily for 120 days 240 capsule 0          Immunization History:     Immunization History   Administered Date(s) Administered    COVID-19 MONOVALENT 12+ (Pfizer) 04/21/2021, 05/11/2021    COVID-19 Monovalent 12+ (Pfizer 2022) 12/17/2021    HPV Quadrivalent 06/11/2007, 08/13/2007, 08/19/2008    Influenza Vaccine >6 months,quad, PF 02/25/2019    Influenza,INJ,MDCK,PF,Quad >6mo(Flucelvax) 11/08/2019    Rhogam 06/04/2019, 09/04/2019    TDAP (Adacel,Boostrix) 08/19/2008, 09/26/2017, 06/20/2019    Td (Adult), Adsorbed 01/01/1999          Allergies:     Allergies   Allergen Reactions    Cephalosporins           Physical Exam:   Vital signs:  There were no vitals taken for this visit.    Physical Examination:  GENERAL:  well-developed, well-nourished, seated in no acute distress.  HEENT:  Head is normocephalic, atraumatic   EYES:  Eyes have anicteric sclerae without conjunctival injection   ENT:  Oropharynx is moist without exudates or ulcers. Tongue is midline  LUNGS:  Breathing easily on room air  ABDOMEN:  Nondistended  SKIN:  No acute rashes.    NEUROLOGIC:  Grossly nonfocal. Active x4 extremities         Laboratory Data:     Metabolic Studies   Sodium   Date Value Ref Range Status   02/10/2024 142 135 - 145 mmol/L Final     Comment:     Reference intervals for this test were updated on 09/26/2023 to more accurately reflect our healthy population. There may be differences in the flagging of prior results with similar values performed with this method. Interpretation of those prior results can be made in the context of the updated reference intervals.    12/13/2023 140 135 - 145 mmol/L Final     Comment:     Reference intervals for this test were updated on 09/26/2023 to more accurately reflect our healthy population. There may be differences in the flagging  of prior results with similar values performed with this method. Interpretation of those prior results can be made in the context of the updated reference intervals.      Potassium   Date Value Ref Range Status   02/10/2024 4.6 3.4 - 5.3 mmol/L Final   12/13/2023 4.1 3.4 - 5.3 mmol/L Final     Chloride   Date Value Ref Range Status   02/10/2024 106 98 - 107 mmol/L Final   12/13/2023 104 98 - 107 mmol/L Final     Carbon Dioxide (CO2)   Date Value Ref Range Status   02/10/2024 28 22 - 29 mmol/L Final   12/13/2023 28 22 - 29 mmol/L Final     Anion Gap   Date Value Ref Range Status   02/10/2024 8 7 - 15 mmol/L Final   12/13/2023 8 7 - 15 mmol/L Final     Urea Nitrogen   Date Value Ref Range Status   02/10/2024 6.5 6.0 - 20.0 mg/dL Final   12/13/2023 10.8 6.0 - 20.0 mg/dL Final     Creatinine   Date Value Ref Range Status   02/10/2024 0.74 0.51 - 0.95 mg/dL Final   12/13/2023 0.88 0.51 - 0.95 mg/dL Final     GFR Estimate   Date Value Ref Range Status   02/10/2024 >90 >60 mL/min/1.73m2 Final   12/13/2023 86 >60 mL/min/1.73m2 Final     Glucose   Date Value Ref Range Status   02/10/2024 76 70 - 99 mg/dL Final   12/13/2023 78 70 - 99 mg/dL Final     Calcium   Date Value Ref Range Status   02/10/2024 8.8 8.6 - 10.0 mg/dL Final   12/13/2023 8.8 8.6 - 10.0 mg/dL Final     CRP Inflammation   Date Value Ref Range Status   12/28/2022 <2.9 0.0 - 8.0 mg/L Final     Inflammatory Markers   CRP Inflammation   Date Value Ref Range Status   12/28/2022 <2.9 0.0 - 8.0 mg/L Final     Hepatic Studies    Bilirubin Total   Date Value Ref Range Status   02/10/2024 0.3 <=1.2 mg/dL Final   12/13/2023 0.6 <=1.2 mg/dL Final     Alkaline Phosphatase   Date Value Ref Range Status   02/10/2024 41 40 - 150 U/L Final     Comment:     Reference intervals for this test were updated on 11/14/2023 to more accurately reflect our healthy population. There may be differences in the flagging of prior results with similar values performed with this method.  Interpretation of those prior results can be made in the context of the updated reference intervals.   12/13/2023 44 40 - 150 U/L Final     Comment:     Reference intervals for this test were updated on 11/14/2023 to more accurately reflect our healthy population. There may be differences in the flagging of prior results with similar values performed with this method. Interpretation of those prior results can be made in the context of the updated reference intervals.     Albumin   Date Value Ref Range Status   02/10/2024 4.1 3.5 - 5.2 g/dL Final   12/13/2023 4.4 3.5 - 5.2 g/dL Final   12/28/2022 4.0 3.4 - 5.0 g/dL Final     AST   Date Value Ref Range Status   02/10/2024 29 0 - 45 U/L Final     Comment:     Reference intervals for this test were updated on 6/12/2023 to more accurately reflect our healthy population. There may be differences in the flagging of prior results with similar values performed with this method. Interpretation of those prior results can be made in the context of the updated reference intervals.   12/13/2023 18 0 - 45 U/L Final     Comment:     Reference intervals for this test were updated on 6/12/2023 to more accurately reflect our healthy population. There may be differences in the flagging of prior results with similar values performed with this method. Interpretation of those prior results can be made in the context of the updated reference intervals.     ALT   Date Value Ref Range Status   02/10/2024 15 0 - 50 U/L Final     Comment:     Reference intervals for this test were updated on 6/12/2023 to more accurately reflect our healthy population. There may be differences in the flagging of prior results with similar values performed with this method. Interpretation of those prior results can be made in the context of the updated reference intervals.     12/13/2023 13 0 - 50 U/L Final     Comment:     Reference intervals for this test were updated on 6/12/2023 to more accurately reflect  our healthy population. There may be differences in the flagging of prior results with similar values performed with this method. Interpretation of those prior results can be made in the context of the updated reference intervals.       Hematology Studies      WBC Count   Date Value Ref Range Status   12/13/2023 6.2 4.0 - 11.0 10e3/uL Final   08/04/2023 7.1 4.0 - 11.0 10e3/uL Final     Hemoglobin   Date Value Ref Range Status   12/13/2023 13.1 11.7 - 15.7 g/dL Final   08/04/2023 12.7 11.7 - 15.7 g/dL Final     Hematocrit   Date Value Ref Range Status   12/13/2023 40.2 35.0 - 47.0 % Final   08/04/2023 39.2 35.0 - 47.0 % Final     Platelet Count   Date Value Ref Range Status   12/13/2023 151 150 - 450 10e3/uL Final   08/04/2023 158 150 - 450 10e3/uL Final     Imaging:  CXR 8/4/23  IMPRESSION: Hyperinflation both lungs. The lungs are otherwise clear. No pleural effusions. Heart size and pulmonary vascularity are within normal limits.

## 2024-03-06 NOTE — NURSING NOTE
"Chief Complaint   Patient presents with    RECHECK     1 month follow-up      Vital signs:  Temp: 98.3  F (36.8  C) Temp src: Oral BP: 104/70 Pulse: 72     SpO2: 96 %     Height: 170.2 cm (5' 7\") (pt reported) Weight: 59.9 kg (132 lb)  Estimated body mass index is 20.67 kg/m  as calculated from the following:    Height as of this encounter: 1.702 m (5' 7\").    Weight as of this encounter: 59.9 kg (132 lb).      Magalie Pastor [facility]   3/6/2024 1:31 PM    "

## 2024-03-16 ENCOUNTER — LAB (OUTPATIENT)
Dept: LAB | Facility: CLINIC | Age: 39
End: 2024-03-16
Payer: COMMERCIAL

## 2024-03-16 DIAGNOSIS — Z22.7 LTBI (LATENT TUBERCULOSIS INFECTION): ICD-10-CM

## 2024-03-16 PROCEDURE — 36415 COLL VENOUS BLD VENIPUNCTURE: CPT

## 2024-03-16 PROCEDURE — 80053 COMPREHEN METABOLIC PANEL: CPT

## 2024-03-17 LAB
ALBUMIN SERPL BCG-MCNC: 4.4 G/DL (ref 3.5–5.2)
ALP SERPL-CCNC: 43 U/L (ref 40–150)
ALT SERPL W P-5'-P-CCNC: 16 U/L (ref 0–50)
ANION GAP SERPL CALCULATED.3IONS-SCNC: 8 MMOL/L (ref 7–15)
AST SERPL W P-5'-P-CCNC: 22 U/L (ref 0–45)
BILIRUB SERPL-MCNC: 0.3 MG/DL
BUN SERPL-MCNC: 9.2 MG/DL (ref 6–20)
CALCIUM SERPL-MCNC: 9.1 MG/DL (ref 8.6–10)
CHLORIDE SERPL-SCNC: 103 MMOL/L (ref 98–107)
CREAT SERPL-MCNC: 0.86 MG/DL (ref 0.51–0.95)
DEPRECATED HCO3 PLAS-SCNC: 29 MMOL/L (ref 22–29)
EGFRCR SERPLBLD CKD-EPI 2021: 89 ML/MIN/1.73M2
GLUCOSE SERPL-MCNC: 87 MG/DL (ref 70–99)
POTASSIUM SERPL-SCNC: 4.6 MMOL/L (ref 3.4–5.3)
PROT SERPL-MCNC: 6.9 G/DL (ref 6.4–8.3)
SODIUM SERPL-SCNC: 140 MMOL/L (ref 135–145)

## 2024-04-14 ENCOUNTER — LAB (OUTPATIENT)
Dept: LAB | Facility: CLINIC | Age: 39
End: 2024-04-14
Payer: COMMERCIAL

## 2024-04-14 DIAGNOSIS — Z22.7 LTBI (LATENT TUBERCULOSIS INFECTION): ICD-10-CM

## 2024-04-14 LAB
ALBUMIN SERPL BCG-MCNC: 4.3 G/DL (ref 3.5–5.2)
ALP SERPL-CCNC: 42 U/L (ref 40–150)
ALT SERPL W P-5'-P-CCNC: 13 U/L (ref 0–50)
ANION GAP SERPL CALCULATED.3IONS-SCNC: 8 MMOL/L (ref 7–15)
AST SERPL W P-5'-P-CCNC: 21 U/L (ref 0–45)
BILIRUB SERPL-MCNC: 0.6 MG/DL
BUN SERPL-MCNC: 5.4 MG/DL (ref 6–20)
CALCIUM SERPL-MCNC: 8.9 MG/DL (ref 8.6–10)
CHLORIDE SERPL-SCNC: 105 MMOL/L (ref 98–107)
CREAT SERPL-MCNC: 0.82 MG/DL (ref 0.51–0.95)
DEPRECATED HCO3 PLAS-SCNC: 27 MMOL/L (ref 22–29)
EGFRCR SERPLBLD CKD-EPI 2021: >90 ML/MIN/1.73M2
GLUCOSE SERPL-MCNC: 73 MG/DL (ref 70–99)
POTASSIUM SERPL-SCNC: 4.6 MMOL/L (ref 3.4–5.3)
PROT SERPL-MCNC: 6.7 G/DL (ref 6.4–8.3)
SODIUM SERPL-SCNC: 140 MMOL/L (ref 135–145)

## 2024-04-14 PROCEDURE — 36415 COLL VENOUS BLD VENIPUNCTURE: CPT

## 2024-04-14 PROCEDURE — 80053 COMPREHEN METABOLIC PANEL: CPT

## 2024-04-28 NOTE — PROGRESS NOTES
4/29/2024      Chief Complaint/Reason for Visit: seropositive RA    HPI:    Halle Alcantara is a 38 year old White female with past medical history listed below.      Today the patient reports having pain of her wrists and ankles.  She said her symptoms have become more noticeable in the last few weeks.  Denied having obvious swelling of joints although she occasionally gets swelling of her fingers.  In the past she used to take hydroxychloroquine only few times a week.  However, in the last few weeks, she has started taking Plaquenil 200 mg daily.  She has not noticed any more lightheadedness as she used to have in the past.  Saw ID and is on rifabutin as treatment for latent TB and is supposed to finish treatment on May 16.  She is also planning to have another baby in the near future.      REVIEW OF SYSTEMS    General - pos for fatigue  HEENT - pos for dry eyes and dry mouth   Cardiovascular - neg for chest pain   Respiratory - neg for cough or sob   Gastrointestinal - denied having bloody diarrhea or difficulty with swallowing, Dx of pernicious anemia  Genitourinary - neg for blood in urine, pain or difficulty with urination   Skin - denied skin rashes, pos for history of vitiligo , neg for malar rash,raynaud's.   Neuro - neg for numbness and tingling, h/o migraine  Hematologic - neg for blood clots  Psych - neg for anxiety or depression   Pregnant? - not currently, had a miscarriage at 12 weeks. Has one child who is 4 years old now - normal delivery, full term healthy baby. She is interested in having another baby in the near future.  Does not smoke, occasionally drinks alcohol. No use of drugs       No past medical history on file.  No past surgical history on file.  Family History   Problem Relation Age of Onset    Thyroid Disease Mother     Rheumatoid Arthritis Father     Diabetes Type 1 Sister     Thyroid Disease Sister      Social History     Socioeconomic History    Marital  status:    Tobacco Use    Smoking status: Never    Smokeless tobacco: Never   Substance and Sexual Activity    Alcohol use: Not Currently    Drug use: Yes    Sexual activity: Yes     Partners: Male       Allergies   Allergen Reactions    Cephalosporins        Current Outpatient Medications   Medication Sig Dispense Refill    hydroxychloroquine (PLAQUENIL) 200 MG tablet Take 1 tablet (200 mg) by mouth daily Annual Plaquenil toxicity eye screening required. 90 tablet 3    levothyroxine (SYNTHROID/LEVOTHROID) 112 MCG tablet Take 1 Tablet by mouth daily.      Prenatal MV-Min-Fe Fum-FA-DHA (PRENATAL MULTIVITAMIN + DHA PO)       rifabutin (MYCOBUTIN) 150 MG capsule Take 2 capsules (300 mg) by mouth daily for 120 days 240 capsule 0     No current facility-administered medications for this visit.       PHYSICAL EXAM    /70 (BP Location: Right arm, Patient Position: Sitting, Cuff Size: Adult Regular)   Pulse 66   Wt 60.5 kg (133 lb 6.4 oz)   LMP 04/28/2024 (Exact Date)   SpO2 99%   BMI 20.89 kg/m        General: Alert, No apparent distress  Eyes: Sclera noninjected  Ears, Nose, Throat, Mouth: Oral mucosa moist with normal salivary pool  Skin: No rashes noted  Neck: No lymphadenopathy  Cardiovascular: Regular rate and rhythm, Normal S1 and S2 and No murmurs, rubs or gallops  Respiratory: Clear to auscultation with no wheezing or crackles  Neuro: Normal gait and Able to arise from seated position unassisted  Musculoskeletal: Hands: Noted puffiness of right third PIP and left second and third PIP.  Wrists:  Normal.  No synovitis  Elbows:  Normal.  Shoulders:  Normal.  Feet:  Normal.  Ankles:  Normal.  Knees:  Normal.              LABS   Reviewed as below.     April 2024  CMP - creatinine and LFT normal    Dec 2023  CBC - normal wbc, hb and plt  CRP normal    Oct 2023  Ds DNA, c3, c4,CRP normal  UA neg for blood or protein     March 2023  HCV neg     2022  RF 50  CCP 80  QTB pos   Hep B surface ag and core ab  neg   MAEGAN 1:160 dense fine speckled   SSA 52 pos   Sm/RNP, SSA 60, SSB, ds DNA neg   Lupus anticoagulant neg   Cardiolipin Ig A and beta 2 glycoprotein Ig G, A and M neg     Exam: TEMPORARY, 10/12/2023 1:20 PM     Indication: Shoulder round cystic-like mass within tendon/muscle  structure, shoulder pain. History of rheumatoid arthritis, rule out RA  nodule, rule out abnormal lymph node.     Comparison: Chest x-ray 8/4/2023     Technique: Real-time sonographic evaluation of the left shoulder  palpable area. Still and cine images obtained and archived.     Findings:      In the patient's palpable area of concern in the superior left  shoulder there is a circumscribed hypoechoic nodule which measures 0.5  x 0.5 x 0.9 cm. No internal vascularity on color Doppler imaging.      Additional smaller hypoechoic nodule in the anterior left shoulder,  anterior to the second/third rib labeled #2 measures 0.2 x 0.5 x 0.6  cm. No internal vascularity on Doppler imaging.     Multiple additional subcentimeter benign lymph nodes with retained  fatty sariah about the left shoulder.                                                                      Impression:      1. Circumscribed hypoechoic nodule corresponding to the patient's  palpable area of concern in the superior left shoulder which may  represent a rheumatoid nodule.  2. Smaller circumscribed hypoechoic nodule anterior to the  second/third rib may represent a additional rheumatoid nodule versus  reactive lymph node.    ASSESSMENT      1. Seropositive rheumatoid arthritis (H)    2. Rheumatoid factor positive    3. Cyclic citrullinated peptide (CCP) antibody positive    4. Positive MAEGAN (antinuclear antibody)    5. SS-A antibody positive    6. Long-term use of high-risk medication    7. 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 Pernicious anemia          (M05.9) Seropositive rheumatoid arthritis (H)  (primary encounter diagnosis)  (R76.8) Rheumatoid factor positive  (R76.8) Cyclic  citrullinated peptide (CCP) antibody positive  Comment: RF 50, CCP-80. X ray hands in 2022 did not show any abnormalities. Currently she is on hydroxychloroquine 200 mg daily. She has her own ophthalmologist at a private practice group in [place]. She may have a mild case of RA. Left shoulder USG - rheumatoid nodule.  Today on exam she does have mild puffiness of PIPs as mentioned above on exam.  Her RA does not seem to be effectively controlled.  Plan:   Continue  mg daily.   Ophthalmology for screening for toxicity from plaquenil - she had a normal eye exam in 2023, she says it was normal.   Once she is status post completion of treatment with rifabutin, we can consider either sulfasalazine or anti-TNF inhibitors.  This was discussed with the patient.    Orders Placed This Encounter   Procedures    CRP inflammation    CBC with platelets differential       (R76.8) Positive MAEGAN (antinuclear antibody)  (R76.8) SS-A antibody positive  Comment: MAEGAN 1:160 dense fine speckled, SSA 52 pos. On HCQ.  Plan: continue HCQ     (Z79.899) Long-term use of high-risk medication  Comment: Hydroxychloroquine   Plan: have explained potential side effects of Plaquenil including but not limited to retinal toxicity, need for annual retinal eye exams by an ophthalmologist, skin pigmentation, possible QTc prolongation and cause for cardiac arrhythmias especially with medication interactions, possible GI upset, possible muscle weakness.     (R76.12) Positive QuantiFERON-TB Gold test  Comment: noted, currently on rifabutin and is supposed to finish treatment on May 16.  Plan: Continue ID recommendations    Pernicious anemia  Comment : Dxed by GI   Plan : follow up with GI     RTC - 3-4 months.    Approximately 30 minutes of non-face-to-face time were spent in review of the patient's medical record on 4/28/2024.This included review of previous clinic visits, hospital records, lab results, imaging studies, and procedural  documentation. The findings from above are summarized in the above note.        GALLO CUELLAR MD    Division of Rheumatic & Autoimmune Diseases  [facility]

## 2024-04-29 ENCOUNTER — OFFICE VISIT (OUTPATIENT)
Dept: RHEUMATOLOGY | Facility: CLINIC | Age: 39
End: 2024-04-29
Attending: INTERNAL MEDICINE
Payer: COMMERCIAL

## 2024-04-29 VITALS
OXYGEN SATURATION: 99 % | SYSTOLIC BLOOD PRESSURE: 106 MMHG | HEART RATE: 66 BPM | BODY MASS INDEX: 20.89 KG/M2 | DIASTOLIC BLOOD PRESSURE: 70 MMHG | WEIGHT: 133.4 LBS

## 2024-04-29 DIAGNOSIS — R76.8 SS-A ANTIBODY POSITIVE: ICD-10-CM

## 2024-04-29 DIAGNOSIS — R76.8 CYCLIC CITRULLINATED PEPTIDE (CCP) ANTIBODY POSITIVE: ICD-10-CM

## 2024-04-29 DIAGNOSIS — D51.0 PERNICIOUS ANEMIA: ICD-10-CM

## 2024-04-29 DIAGNOSIS — R76.8 RHEUMATOID FACTOR POSITIVE: ICD-10-CM

## 2024-04-29 DIAGNOSIS — R76.8 POSITIVE ANA (ANTINUCLEAR ANTIBODY): ICD-10-CM

## 2024-04-29 DIAGNOSIS — R76.12 POSITIVE QUANTIFERON-TB GOLD TEST: ICD-10-CM

## 2024-04-29 DIAGNOSIS — M05.9 SEROPOSITIVE RHEUMATOID ARTHRITIS (H): Primary | ICD-10-CM

## 2024-04-29 DIAGNOSIS — Z79.899 LONG-TERM USE OF HIGH-RISK MEDICATION: ICD-10-CM

## 2024-04-29 PROCEDURE — 99213 OFFICE O/P EST LOW 20 MIN: CPT | Performed by: INTERNAL MEDICINE

## 2024-04-29 PROCEDURE — 99214 OFFICE O/P EST MOD 30 MIN: CPT | Performed by: INTERNAL MEDICINE

## 2024-04-29 ASSESSMENT — PAIN SCALES - GENERAL: PAINLEVEL: MODERATE PAIN (4)

## 2024-04-29 NOTE — PATIENT INSTRUCTIONS
Brand Names: US  Azulfidine;     Azulfidine EN-tabs    Brand Names: [place]  PMS-Sulfasalazine;     Salazopyrin    What is this drug used for?  It is used to treat rheumatoid arthritis.  It is used to treat ulcerative colitis.  It may be given to you for other reasons. Talk with the doctor.    What do I need to tell my doctor BEFORE I take this drug?  If you are allergic to this drug; any part of this drug; or any other drugs, foods, or substances. Tell your doctor about the allergy and what signs you had.  If you have a sulfa allergy.  If you have any of these health problems: Bowel block, porphyria, or trouble passing urine.  This is not a list of all drugs or health problems that interact with this drug.  Tell your doctor and pharmacist about all of your drugs (prescription or OTC, natural products, vitamins) and health problems. You must check to make sure that it is safe for you to take this drug with all of your drugs and health problems. Do not start, stop, or change the dose of any drug without checking with your doctor.    What are some things I need to know or do while I take this drug?  All products:  Tell all of your health care providers that you take this drug. This includes your doctors, nurses, pharmacists, and dentists.  If you have asthma, talk with your doctor. You may be more sensitive to this drug.  Be careful if you have low levels of an enzyme called G6PD. Anemia may happen. Low levels of G6PD may be more likely in patients of , South , , and Mediterranean descent.  Have blood work checked as you have been told by the doctor. Talk with the doctor.  Have your urine checked as you have been told by your doctor.  This drug may affect certain lab tests. Tell all of your health care providers and lab workers that you take this drug.  This drug may change the color of urine or skin to a yellow or orange color. This is not harmful.  Very bad and sometimes deadly allergic  reactions, infections, heart problems, kidney problems, liver problems, lung problems, and blood problems have happened with this drug. Nerve or muscle problems that have not gone away have also happened with this drug. Talk with the doctor.  Sperm problems have happened while taking this drug. This may affect being able to father a child. This may go back to normal after the drug is stopped. If you have questions, talk with the doctor.  Tell your doctor if you are pregnant, plan on getting pregnant, or are breast-feeding. You will need to talk about the benefits and risks to you and the baby.  Delayed-release tablets:  You may see something that looks like the tablet in your stool. If this happens, talk with your doctor.    What are some side effects that I need to call my doctor about right away?  WARNING/CAUTION: Even though it may be rare, some people may have very bad and sometimes deadly side effects when taking a drug. Tell your doctor or get medical help right away if you have any of the following signs or symptoms that may be related to a very bad side effect:  Signs of an allergic reaction, like rash; hives; itching; red, swollen, blistered, or peeling skin with or without fever; wheezing; tightness in the chest or throat; trouble breathing, swallowing, or talking; unusual hoarseness; or swelling of the mouth, face, lips, tongue, or throat.  Signs of liver problems like dark urine, tiredness, decreased appetite, upset stomach or stomach pain, light-colored stools, throwing up, or yellow skin or eyes.  Signs of kidney problems like unable to pass urine, change in how much urine is passed, blood in the urine, or a big weight gain.  Signs of infection like fever, chills, very bad sore throat, ear or sinus pain, cough, more sputum or change in color of sputum, pain with passing urine, mouth sores, or wound that will not heal.  Feeling very tired or weak.  Pale skin.  Any unexplained bruising or  bleeding.  Swollen gland.  Shortness of breath, a big weight gain, or swelling in the arms or legs.  Severe skin reactions have happened with this drug. These have included Bradley-Feroz syndrome (SJS), toxic epidermal necrolysis (TEN), and other severe skin reactions. Sometimes these have been deadly. Get medical help right away if you have signs like red, swollen, blistered, or peeling skin; other skin irritation (with or without fever); red or irritated eyes; or sores in your mouth, throat, nose, or eyes.    What are some other side effects of this drug?  All drugs may cause side effects. However, many people have no side effects or only have minor side effects. Call your doctor or get medical help if any of these side effects or any other side effects bother you or do not go away:  Headache.  Stomach pain or heartburn.  Upset stomach or throwing up.  Decreased appetite.  These are not all of the side effects that may occur. If you have questions about side effects, call your doctor. Call your doctor for medical advice about side effects.  You may report side effects to your national health agency.    How is this drug best taken?  Use this drug as ordered by your doctor. Read all information given to you. Follow all instructions closely.  All products:  Take after meals.  Keep taking this drug as you have been told by your doctor or other health care provider, even if you feel well.  Drink lots of noncaffeine liquids unless told to drink less liquid by your doctor.  Delayed-release tablets:  Swallow whole. Do not chew, break, or crush.    What do I do if I miss a dose?  Take a missed dose as soon as you think about it.  If it is close to the time for your next dose, skip the missed dose and go back to your normal time.  Do not take 2 doses at the same time or extra doses.    How do I store and/or throw out this drug?  Store at room temperature in a dry place. Do not store in a bathroom.  Keep all drugs in a  safe place. Keep all drugs out of the reach of children and pets.  Throw away unused or  drugs. Do not flush down a toilet or pour down a drain unless you are told to do so. Check with your pharmacist if you have questions about the best way to throw out drugs. There may be drug take-back programs in your area.    General drug facts  If your symptoms or health problems do not get better or if they become worse, call your doctor.  Do not share your drugs with others and do not take anyone else's drugs.  Some drugs may have another patient information leaflet. If you have any questions about this drug, please talk with your doctor, nurse, pharmacist, or other health care provider.  If you think there has been an overdose, call your poison control center or get medical care right away. Be ready to tell or show what was taken, how much, and when it happened.

## 2024-04-29 NOTE — NURSING NOTE
Chief Complaint   Patient presents with    RECHECK     /70 (BP Location: Right arm, Patient Position: Sitting, Cuff Size: Adult Regular)   Pulse 66   Wt 60.5 kg (133 lb 6.4 oz)   LMP 04/28/2024 (Exact Date)   SpO2 99%   BMI 20.89 kg/m

## 2024-04-29 NOTE — LETTER
4/29/2024       RE: Halle Alcantara  [address]  [facility] [address]     Dear Colleague,    Thank you for referring your patient, Halle Alcantara, to the [facility] RHEUMATOLOGY at [facility]. Please see a copy of my visit note below.                     4/29/2024      Chief Complaint/Reason for Visit: seropositive RA    HPI:    Halle Alcantara is a 38 year old White female with past medical history listed below.      Today the patient reports having pain of her wrists and ankles.  She said her symptoms have become more noticeable in the last few weeks.  Denied having obvious swelling of joints although she occasionally gets swelling of her fingers.  In the past she used to take hydroxychloroquine only few times a week.  However, in the last few weeks, she has started taking Plaquenil 200 mg daily.  She has not noticed any more lightheadedness as she used to have in the past.  Saw ID and is on rifabutin as treatment for latent TB and is supposed to finish treatment on May 16.  She is also planning to have another baby in the near future.      REVIEW OF SYSTEMS    General - pos for fatigue  HEENT - pos for dry eyes and dry mouth   Cardiovascular - neg for chest pain   Respiratory - neg for cough or sob   Gastrointestinal - denied having bloody diarrhea or difficulty with swallowing, Dx of pernicious anemia  Genitourinary - neg for blood in urine, pain or difficulty with urination   Skin - denied skin rashes, pos for history of vitiligo , neg for malar rash,raynaud's.   Neuro - neg for numbness and tingling, h/o migraine  Hematologic - neg for blood clots  Psych - neg for anxiety or depression   Pregnant? - not currently, had a miscarriage at 12 weeks. Has one child who is 4 years old now - normal delivery, full term healthy baby. She is interested in having another baby in the near future.  Does not smoke, occasionally drinks  alcohol. No use of drugs       No past medical history on file.  No past surgical history on file.  Family History   Problem Relation Age of Onset    Thyroid Disease Mother     Rheumatoid Arthritis Father     Diabetes Type 1 Sister     Thyroid Disease Sister      Social History     Socioeconomic History    Marital status:    Tobacco Use    Smoking status: Never    Smokeless tobacco: Never   Substance and Sexual Activity    Alcohol use: Not Currently    Drug use: Yes    Sexual activity: Yes     Partners: Male       Allergies   Allergen Reactions    Cephalosporins        Current Outpatient Medications   Medication Sig Dispense Refill    hydroxychloroquine (PLAQUENIL) 200 MG tablet Take 1 tablet (200 mg) by mouth daily Annual Plaquenil toxicity eye screening required. 90 tablet 3    levothyroxine (SYNTHROID/LEVOTHROID) 112 MCG tablet Take 1 Tablet by mouth daily.      Prenatal MV-Min-Fe Fum-FA-DHA (PRENATAL MULTIVITAMIN + DHA PO)       rifabutin (MYCOBUTIN) 150 MG capsule Take 2 capsules (300 mg) by mouth daily for 120 days 240 capsule 0     No current facility-administered medications for this visit.       PHYSICAL EXAM    /70 (BP Location: Right arm, Patient Position: Sitting, Cuff Size: Adult Regular)   Pulse 66   Wt 60.5 kg (133 lb 6.4 oz)   LMP 04/28/2024 (Exact Date)   SpO2 99%   BMI 20.89 kg/m        General: Alert, No apparent distress  Eyes: Sclera noninjected  Ears, Nose, Throat, Mouth: Oral mucosa moist with normal salivary pool  Skin: No rashes noted  Neck: No lymphadenopathy  Cardiovascular: Regular rate and rhythm, Normal S1 and S2 and No murmurs, rubs or gallops  Respiratory: Clear to auscultation with no wheezing or crackles  Neuro: Normal gait and Able to arise from seated position unassisted  Musculoskeletal: Hands: Noted puffiness of right third PIP and left second and third PIP.  Wrists:  Normal.  No synovitis  Elbows:  Normal.  Shoulders:  Normal.  Feet:  Normal.  Ankles:   Normal.  Knees:  Normal.              LABS   Reviewed as below.     April 2024  CMP - creatinine and LFT normal    Dec 2023  CBC - normal wbc, hb and plt  CRP normal    Oct 2023  Ds DNA, c3, c4,CRP normal  UA neg for blood or protein     March 2023  HCV neg     2022  RF 50  CCP 80  QTB pos   Hep B surface ag and core ab neg   MAEGAN 1:160 dense fine speckled   SSA 52 pos   Sm/RNP, SSA 60, SSB, ds DNA neg   Lupus anticoagulant neg   Cardiolipin Ig A and beta 2 glycoprotein Ig G, A and M neg     Exam: TEMPORARY, 10/12/2023 1:20 PM     Indication: Shoulder round cystic-like mass within tendon/muscle  structure, shoulder pain. History of rheumatoid arthritis, rule out RA  nodule, rule out abnormal lymph node.     Comparison: Chest x-ray 8/4/2023     Technique: Real-time sonographic evaluation of the left shoulder  palpable area. Still and cine images obtained and archived.     Findings:      In the patient's palpable area of concern in the superior left  shoulder there is a circumscribed hypoechoic nodule which measures 0.5  x 0.5 x 0.9 cm. No internal vascularity on color Doppler imaging.      Additional smaller hypoechoic nodule in the anterior left shoulder,  anterior to the second/third rib labeled #2 measures 0.2 x 0.5 x 0.6  cm. No internal vascularity on Doppler imaging.     Multiple additional subcentimeter benign lymph nodes with retained  fatty sariah about the left shoulder.                                                                      Impression:      1. Circumscribed hypoechoic nodule corresponding to the patient's  palpable area of concern in the superior left shoulder which may  represent a rheumatoid nodule.  2. Smaller circumscribed hypoechoic nodule anterior to the  second/third rib may represent a additional rheumatoid nodule versus  reactive lymph node.    ASSESSMENT      1. Seropositive rheumatoid arthritis (H)    2. Rheumatoid factor positive    3. Cyclic citrullinated peptide (CCP) antibody  positive    4. Positive MAEGAN (antinuclear antibody)    5. SS-A antibody positive    6. Long-term use of high-risk medication    7. 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 Pernicious anemia          (M05.9) Seropositive rheumatoid arthritis (H)  (primary encounter diagnosis)  (R76.8) Rheumatoid factor positive  (R76.8) Cyclic citrullinated peptide (CCP) antibody positive  Comment: RF 50, CCP-80. X ray hands in 2022 did not show any abnormalities. Currently she is on hydroxychloroquine 200 mg daily. She has her own ophthalmologist at a private practice group in [place]. She may have a mild case of RA. Left shoulder USG - rheumatoid nodule.  Today on exam she does have mild puffiness of PIPs as mentioned above on exam.  Her RA does not seem to be effectively controlled.  Plan:   Continue  mg daily.   Ophthalmology for screening for toxicity from plaquenil - she had a normal eye exam in 2023, she says it was normal.   Once she is status post completion of treatment with rifabutin, we can consider either sulfasalazine or anti-TNF inhibitors.  This was discussed with the patient.    Orders Placed This Encounter   Procedures    CRP inflammation    CBC with platelets differential       (R76.8) Positive MAEGAN (antinuclear antibody)  (R76.8) SS-A antibody positive  Comment: MAEGAN 1:160 dense fine speckled, SSA 52 pos. On HCQ.  Plan: continue HCQ     (Z79.899) Long-term use of high-risk medication  Comment: Hydroxychloroquine   Plan: have explained potential side effects of Plaquenil including but not limited to retinal toxicity, need for annual retinal eye exams by an ophthalmologist, skin pigmentation, possible QTc prolongation and cause for cardiac arrhythmias especially with medication interactions, possible GI upset, possible muscle weakness.     (R76.12) Positive QuantiFERON-TB Gold test  Comment: noted, currently on rifabutin and is supposed to finish treatment on May 16.  Plan: Continue ID  recommendations    Pernicious anemia  Comment : Dxed by GI   Plan : follow up with GI     RTC - 3-4 months.    GALLO CUELLAR MD    Division of Rheumatic & Autoimmune Diseases  [facility]

## 2024-05-01 ENCOUNTER — LAB (OUTPATIENT)
Dept: LAB | Facility: CLINIC | Age: 39
End: 2024-05-01
Payer: COMMERCIAL

## 2024-05-01 DIAGNOSIS — M05.9 SEROPOSITIVE RHEUMATOID ARTHRITIS (H): ICD-10-CM

## 2024-05-01 LAB
BASOPHILS # BLD AUTO: 0.1 10E3/UL (ref 0–0.2)
BASOPHILS NFR BLD AUTO: 2 %
CRP SERPL-MCNC: <3 MG/L
EOSINOPHIL # BLD AUTO: 0.2 10E3/UL (ref 0–0.7)
EOSINOPHIL NFR BLD AUTO: 4 %
ERYTHROCYTE [DISTWIDTH] IN BLOOD BY AUTOMATED COUNT: 13.4 % (ref 10–15)
HCT VFR BLD AUTO: 38.4 % (ref 35–47)
HGB BLD-MCNC: 12.8 G/DL (ref 11.7–15.7)
IMM GRANULOCYTES # BLD: 0 10E3/UL
IMM GRANULOCYTES NFR BLD: 0 %
LYMPHOCYTES # BLD AUTO: 1.2 10E3/UL (ref 0.8–5.3)
LYMPHOCYTES NFR BLD AUTO: 25 %
MCH RBC QN AUTO: 29.2 PG (ref 26.5–33)
MCHC RBC AUTO-ENTMCNC: 33.3 G/DL (ref 31.5–36.5)
MCV RBC AUTO: 88 FL (ref 78–100)
MONOCYTES # BLD AUTO: 0.4 10E3/UL (ref 0–1.3)
MONOCYTES NFR BLD AUTO: 10 %
NEUTROPHILS # BLD AUTO: 2.8 10E3/UL (ref 1.6–8.3)
NEUTROPHILS NFR BLD AUTO: 60 %
PLATELET # BLD AUTO: 152 10E3/UL (ref 150–450)
RBC # BLD AUTO: 4.38 10E6/UL (ref 3.8–5.2)
WBC # BLD AUTO: 4.6 10E3/UL (ref 4–11)

## 2024-05-01 PROCEDURE — 36415 COLL VENOUS BLD VENIPUNCTURE: CPT

## 2024-05-01 PROCEDURE — 86140 C-REACTIVE PROTEIN: CPT

## 2024-05-01 PROCEDURE — 85025 COMPLETE CBC W/AUTO DIFF WBC: CPT

## 2024-05-09 ENCOUNTER — DOCUMENTATION ONLY (OUTPATIENT)
Dept: INFECTIOUS DISEASES | Facility: CLINIC | Age: 39
End: 2024-05-09
Payer: COMMERCIAL

## 2024-05-09 DIAGNOSIS — Z22.7 LTBI (LATENT TUBERCULOSIS INFECTION): Primary | ICD-10-CM

## 2024-05-15 ENCOUNTER — OFFICE VISIT (OUTPATIENT)
Dept: INFECTIOUS DISEASES | Facility: CLINIC | Age: 39
End: 2024-05-15
Attending: STUDENT IN AN ORGANIZED HEALTH CARE EDUCATION/TRAINING PROGRAM
Payer: COMMERCIAL

## 2024-05-15 VITALS
TEMPERATURE: 98.5 F | HEART RATE: 72 BPM | HEIGHT: 67 IN | SYSTOLIC BLOOD PRESSURE: 108 MMHG | WEIGHT: 133.3 LBS | DIASTOLIC BLOOD PRESSURE: 69 MMHG | BODY MASS INDEX: 20.92 KG/M2 | OXYGEN SATURATION: 98 %

## 2024-05-15 DIAGNOSIS — Z22.7 LTBI (LATENT TUBERCULOSIS INFECTION): Primary | ICD-10-CM

## 2024-05-15 PROCEDURE — 99214 OFFICE O/P EST MOD 30 MIN: CPT | Performed by: STUDENT IN AN ORGANIZED HEALTH CARE EDUCATION/TRAINING PROGRAM

## 2024-05-15 PROCEDURE — 99213 OFFICE O/P EST LOW 20 MIN: CPT | Performed by: STUDENT IN AN ORGANIZED HEALTH CARE EDUCATION/TRAINING PROGRAM

## 2024-05-15 ASSESSMENT — PAIN SCALES - GENERAL: PAINLEVEL: NO PAIN (0)

## 2024-05-15 NOTE — NURSING NOTE
"Chief Complaint   Patient presents with    Follow Up     /69   Pulse 72   Temp 98.5  F (36.9  C) (Oral)   Ht 1.702 m (5' 7\")   Wt 60.5 kg (133 lb 4.8 oz)   LMP 04/28/2024 (Exact Date)   SpO2 98%   BMI 20.88 kg/m    Amadeo Galicia MA on 5/15/2024 at 2:03 PM    "

## 2024-05-15 NOTE — PROGRESS NOTES
[facility]  Dr. Valentino Landers, [facility], Floor 3  [address] [place] [address]   Patient:  Halle Alcantara, Date of birth 1985, Medical record number [id number]  Date of Visit:  05/15/2024         Assessment and Recommendations:   ID Problem List  LTBI  Rheumatoid arthritis, on HCQ  Hypothyroidism    Recommendations  Continue rifabutin 300mg PO qDay, ending in two days (5/16/24)  Selected this rather than rifampin to avoid HCQ interaction  LFTs reviewed and normal (4/14/24 labs)  No need for repeat LFTs at this point due to end of therapy  No need for scheduled ID follow-up    Discussion  37yo F with h/o RA (on HCQ), hypothyroidism, pernicious anemia who presents for evaluation and management of positive Quantiferon.     Given HCQ being used for RA, have opted for rifabutin x 4 months as LTBI therapy. Counseled on avoiding pregnancy while on therapy, as well as on potential adverse effects of rifabutin (LFT elevation, GI upset, body fluid staining). Pt agreeable to therapy, and started on 1/17/24, with end date planned for 5/16/24. LFTs from 4/14/24 reviewed and normal.     Pt has now completed LTBI therapy - counseled that Quantiferon will remain positive for live, most likely, and should not be used to detect future LTBI diagnoses.    Valentino Landers MD  Division of Infectious Diseases and International Medicine  [phone]    I spent at least 30 minutes on the day of this encounter on chart review, patient exam/interview, and note preparation.         History of Infectious Disease Illness:     37yo F with h/o RA (on HCQ), hypothyroidism, pernicious anemia who presents for evaluation and management of positive Quantiferon.    Quantiferon was checked 7/11/22 as part of routine labwork in rheumatology clinic. A chest x-ray on 7/15/22 (and a further on 8/4/23) did not show signs of active TB. No treatment was initiated for LTBI at the  time of testing, and she is now referred for treatment due to possible pregnancy in the near future.    She denies any history of persistent dyspnea or cough. Similarly denies any history of hemoptysis, unintended weight loss, unexplained fever. She has had prior night sweats, though says these are rare, not persistent, and haven't occurred recently. Only foreign travel was studying abroad in [place] in 2008 (with some travel to surrounding areas of [place]). Lived in a dormitory in college. No history of living in barracks, no prior time in custodial/senior living, no history of homelessness. No known positive TB contacts. No prior TB treatment. Has one 4.5yr old son who is healthy.         Past Medical and Surgical History:   RA, hypothyroidism, pernicious anemia        Family History:     Family History   Problem Relation Age of Onset    Thyroid Disease Mother     Rheumatoid Arthritis Father     Diabetes Type 1 Sister     Thyroid Disease Sister          Social History:     Social History     Tobacco Use    Smoking status: Never    Smokeless tobacco: Never   Vaping Use    Vaping status: Never Used   Substance Use Topics    Alcohol use: Not Currently    Drug use: Yes     Social History     Social History Narrative    Not on file          Review of Systems:   CONSTITUTIONAL:  No fevers or chills  EYES: negative for icterus  ENT:  negative for hearing loss, tinnitus, sore throat  RESPIRATORY:  negative for cough, sputum or dyspnea  CARDIOVASCULAR:  negative for chest pain, palpitations  GASTROINTESTINAL:  negative for nausea, vomiting, diarrhea or constipation  GENITOURINARY:  negative for dysuria  HEME:  No easy bruising  INTEGUMENT:  negative for rash or pruritus  NEURO:  Negative for headache         Current Medications:     Current Outpatient Medications   Medication Sig Dispense Refill    hydroxychloroquine (PLAQUENIL) 200 MG tablet Take 1 tablet (200 mg) by mouth daily Annual Plaquenil toxicity eye screening required.  90 tablet 3    levothyroxine (SYNTHROID/LEVOTHROID) 112 MCG tablet Take 1 Tablet by mouth daily.      Prenatal MV-Min-Fe Fum-FA-DHA (PRENATAL MULTIVITAMIN + DHA PO)       rifabutin (MYCOBUTIN) 150 MG capsule Take 2 capsules (300 mg) by mouth daily for 120 days 240 capsule 0          Immunization History:     Immunization History   Administered Date(s) Administered    COVID-19 MONOVALENT 12+ (Pfizer) 04/21/2021, 05/11/2021    COVID-19 Monovalent 12+ (Pfizer 2022) 12/17/2021    HPV Quadrivalent 06/11/2007, 08/13/2007, 08/19/2008    Influenza Vaccine >6 months,quad, PF 02/25/2019    Influenza,INJ,MDCK,PF,Quad >6mo(Flucelvax) 11/08/2019    Rhogam 06/04/2019, 09/04/2019    TDAP (Adacel,Boostrix) 08/19/2008, 09/26/2017, 06/20/2019    Td (Adult), Adsorbed 01/01/1999          Allergies:     Allergies   Allergen Reactions    Cephalosporins           Physical Exam:   Vital signs:  LMP 04/28/2024 (Exact Date)     Physical Examination:  GENERAL:  well-developed, well-nourished, seated in no acute distress.  HEENT:  Head is normocephalic, atraumatic   EYES:  Eyes have anicteric sclerae without conjunctival injection   ENT:  Oropharynx is moist without exudates or ulcers. Tongue is midline  LUNGS:  Breathing easily on room air  ABDOMEN:  Nondistended  SKIN:  No acute rashes.    NEUROLOGIC:  Grossly nonfocal. Active x4 extremities         Laboratory Data:     Metabolic Studies   Sodium   Date Value Ref Range Status   04/14/2024 140 135 - 145 mmol/L Final     Comment:     Reference intervals for this test were updated on 09/26/2023 to more accurately reflect our healthy population. There may be differences in the flagging of prior results with similar values performed with this method. Interpretation of those prior results can be made in the context of the updated reference intervals.    03/16/2024 140 135 - 145 mmol/L Final     Comment:     Reference intervals for this test were updated on 09/26/2023 to more accurately reflect our  healthy population. There may be differences in the flagging of prior results with similar values performed with this method. Interpretation of those prior results can be made in the context of the updated reference intervals.      Potassium   Date Value Ref Range Status   04/14/2024 4.6 3.4 - 5.3 mmol/L Final   03/16/2024 4.6 3.4 - 5.3 mmol/L Final     Chloride   Date Value Ref Range Status   04/14/2024 105 98 - 107 mmol/L Final   03/16/2024 103 98 - 107 mmol/L Final     Carbon Dioxide (CO2)   Date Value Ref Range Status   04/14/2024 27 22 - 29 mmol/L Final   03/16/2024 29 22 - 29 mmol/L Final     Anion Gap   Date Value Ref Range Status   04/14/2024 8 7 - 15 mmol/L Final   03/16/2024 8 7 - 15 mmol/L Final     Urea Nitrogen   Date Value Ref Range Status   04/14/2024 5.4 (L) 6.0 - 20.0 mg/dL Final   03/16/2024 9.2 6.0 - 20.0 mg/dL Final     Creatinine   Date Value Ref Range Status   04/14/2024 0.82 0.51 - 0.95 mg/dL Final   03/16/2024 0.86 0.51 - 0.95 mg/dL Final     GFR Estimate   Date Value Ref Range Status   04/14/2024 >90 >60 mL/min/1.73m2 Final   03/16/2024 89 >60 mL/min/1.73m2 Final     Glucose   Date Value Ref Range Status   04/14/2024 73 70 - 99 mg/dL Final   03/16/2024 87 70 - 99 mg/dL Final     Calcium   Date Value Ref Range Status   04/14/2024 8.9 8.6 - 10.0 mg/dL Final   03/16/2024 9.1 8.6 - 10.0 mg/dL Final     CRP Inflammation   Date Value Ref Range Status   12/28/2022 <2.9 0.0 - 8.0 mg/L Final     Inflammatory Markers   CRP Inflammation   Date Value Ref Range Status   12/28/2022 <2.9 0.0 - 8.0 mg/L Final     Hepatic Studies    Bilirubin Total   Date Value Ref Range Status   04/14/2024 0.6 <=1.2 mg/dL Final   03/16/2024 0.3 <=1.2 mg/dL Final     Alkaline Phosphatase   Date Value Ref Range Status   04/14/2024 42 40 - 150 U/L Final     Comment:     Reference intervals for this test were updated on 11/14/2023 to more accurately reflect our healthy population. There may be differences in the flagging of  prior results with similar values performed with this method. Interpretation of those prior results can be made in the context of the updated reference intervals.   03/16/2024 43 40 - 150 U/L Final     Comment:     Reference intervals for this test were updated on 11/14/2023 to more accurately reflect our healthy population. There may be differences in the flagging of prior results with similar values performed with this method. Interpretation of those prior results can be made in the context of the updated reference intervals.     Albumin   Date Value Ref Range Status   04/14/2024 4.3 3.5 - 5.2 g/dL Final   03/16/2024 4.4 3.5 - 5.2 g/dL Final   12/28/2022 4.0 3.4 - 5.0 g/dL Final     AST   Date Value Ref Range Status   04/14/2024 21 0 - 45 U/L Final     Comment:     Reference intervals for this test were updated on 6/12/2023 to more accurately reflect our healthy population. There may be differences in the flagging of prior results with similar values performed with this method. Interpretation of those prior results can be made in the context of the updated reference intervals.   03/16/2024 22 0 - 45 U/L Final     Comment:     Reference intervals for this test were updated on 6/12/2023 to more accurately reflect our healthy population. There may be differences in the flagging of prior results with similar values performed with this method. Interpretation of those prior results can be made in the context of the updated reference intervals.     ALT   Date Value Ref Range Status   04/14/2024 13 0 - 50 U/L Final     Comment:     Reference intervals for this test were updated on 6/12/2023 to more accurately reflect our healthy population. There may be differences in the flagging of prior results with similar values performed with this method. Interpretation of those prior results can be made in the context of the updated reference intervals.     03/16/2024 16 0 - 50 U/L Final     Comment:     Reference intervals for  this test were updated on 6/12/2023 to more accurately reflect our healthy population. There may be differences in the flagging of prior results with similar values performed with this method. Interpretation of those prior results can be made in the context of the updated reference intervals.       Hematology Studies      WBC Count   Date Value Ref Range Status   05/01/2024 4.6 4.0 - 11.0 10e3/uL Final   12/13/2023 6.2 4.0 - 11.0 10e3/uL Final     Hemoglobin   Date Value Ref Range Status   05/01/2024 12.8 11.7 - 15.7 g/dL Final   12/13/2023 13.1 11.7 - 15.7 g/dL Final     Hematocrit   Date Value Ref Range Status   05/01/2024 38.4 35.0 - 47.0 % Final   12/13/2023 40.2 35.0 - 47.0 % Final     Platelet Count   Date Value Ref Range Status   05/01/2024 152 150 - 450 10e3/uL Final   12/13/2023 151 150 - 450 10e3/uL Final     Imaging:  CXR 8/4/23  IMPRESSION: Hyperinflation both lungs. The lungs are otherwise clear. No pleural effusions. Heart size and pulmonary vascularity are within normal limits.

## 2024-05-15 NOTE — LETTER
5/15/2024       RE: Halle Alcantara  [address]  [facility] [address]     Dear Colleague,    Thank you for referring your patient, Halle Alcantara, to the [facility] [place] [facility]. Please see a copy of my visit note below.     [facility]  Dr. Valentino Landers, [facility], Floor 3  [address] [place] [address]   Patient:  Halle Alcantara, Date of birth 1985, Medical record number [id number]  Date of Visit:  05/15/2024         Assessment and Recommendations:   ID Problem List  LTBI  Rheumatoid arthritis, on HCQ  Hypothyroidism    Recommendations  Continue rifabutin 300mg PO qDay, ending in two days (5/16/24)  Selected this rather than rifampin to avoid HCQ interaction  LFTs reviewed and normal (4/14/24 labs)  No need for repeat LFTs at this point due to end of therapy  No need for scheduled ID follow-up    Discussion  37yo F with h/o RA (on HCQ), hypothyroidism, pernicious anemia who presents for evaluation and management of positive Quantiferon.     Given HCQ being used for RA, have opted for rifabutin x 4 months as LTBI therapy. Counseled on avoiding pregnancy while on therapy, as well as on potential adverse effects of rifabutin (LFT elevation, GI upset, body fluid staining). Pt agreeable to therapy, and started on 1/17/24, with end date planned for 5/16/24. LFTs from 4/14/24 reviewed and normal.     Pt has now completed LTBI therapy - counseled that Quantiferon will remain positive for live, most likely, and should not be used to detect future LTBI diagnoses.    Valentino Landers MD  Division of Infectious Diseases and International Medicine  [phone]    I spent at least 30 minutes on the day of this encounter on chart review, patient exam/interview, and note preparation.         History of Infectious Disease Illness:      39yo F with h/o RA (on HCQ), hypothyroidism, pernicious anemia who presents for evaluation and management of positive Quantiferon.    Quantiferon was checked 7/11/22 as part of routine labwork in rheumatology clinic. A chest x-ray on 7/15/22 (and a further on 8/4/23) did not show signs of active TB. No treatment was initiated for LTBI at the time of testing, and she is now referred for treatment due to possible pregnancy in the near future.    She denies any history of persistent dyspnea or cough. Similarly denies any history of hemoptysis, unintended weight loss, unexplained fever. She has had prior night sweats, though says these are rare, not persistent, and haven't occurred recently. Only foreign travel was studying abroad in [place] in 2008 (with some travel to surrounding areas of [place]). [place]. No history of living in barracks, no prior time in assisted/long-term, no history of homelessness. No known positive TB contacts. No prior TB treatment. Has one 4.5yr old son who is healthy.         Past Medical and Surgical History:   RA, hypothyroidism, pernicious anemia        Family History:     Family History   Problem Relation Age of Onset    Thyroid Disease Mother     Rheumatoid Arthritis Father     Diabetes Type 1 Sister     Thyroid Disease Sister          Social History:     Social History     Tobacco Use    Smoking status: Never    Smokeless tobacco: Never   Vaping Use    Vaping status: Never Used   Substance Use Topics    Alcohol use: Not Currently    Drug use: Yes     Social History     Social History Narrative    Not on file          Review of Systems:   CONSTITUTIONAL:  No fevers or chills  EYES: negative for icterus  ENT:  negative for hearing loss, tinnitus, sore throat  RESPIRATORY:  negative for cough, sputum or dyspnea  CARDIOVASCULAR:  negative for chest pain, palpitations  GASTROINTESTINAL:  negative for nausea, vomiting, diarrhea or constipation  GENITOURINARY:   negative for dysuria  HEME:  No easy bruising  INTEGUMENT:  negative for rash or pruritus  NEURO:  Negative for headache         Current Medications:     Current Outpatient Medications   Medication Sig Dispense Refill    hydroxychloroquine (PLAQUENIL) 200 MG tablet Take 1 tablet (200 mg) by mouth daily Annual Plaquenil toxicity eye screening required. 90 tablet 3    levothyroxine (SYNTHROID/LEVOTHROID) 112 MCG tablet Take 1 Tablet by mouth daily.      Prenatal MV-Min-Fe Fum-FA-DHA (PRENATAL MULTIVITAMIN + DHA PO)       rifabutin (MYCOBUTIN) 150 MG capsule Take 2 capsules (300 mg) by mouth daily for 120 days 240 capsule 0          Immunization History:     Immunization History   Administered Date(s) Administered    COVID-19 MONOVALENT 12+ (Pfizer) 04/21/2021, 05/11/2021    COVID-19 Monovalent 12+ (Pfizer 2022) 12/17/2021    HPV Quadrivalent 06/11/2007, 08/13/2007, 08/19/2008    Influenza Vaccine >6 months,quad, PF 02/25/2019    Influenza,INJ,MDCK,PF,Quad >6mo(Flucelvax) 11/08/2019    Rhogam 06/04/2019, 09/04/2019    TDAP (Adacel,Boostrix) 08/19/2008, 09/26/2017, 06/20/2019    Td (Adult), Adsorbed 01/01/1999          Allergies:     Allergies   Allergen Reactions    Cephalosporins           Physical Exam:   Vital signs:  LMP 04/28/2024 (Exact Date)     Physical Examination:  GENERAL:  well-developed, well-nourished, seated in no acute distress.  HEENT:  Head is normocephalic, atraumatic   EYES:  Eyes have anicteric sclerae without conjunctival injection   ENT:  Oropharynx is moist without exudates or ulcers. Tongue is midline  LUNGS:  Breathing easily on room air  ABDOMEN:  Nondistended  SKIN:  No acute rashes.    NEUROLOGIC:  Grossly nonfocal. Active x4 extremities         Laboratory Data:     Metabolic Studies   Sodium   Date Value Ref Range Status   04/14/2024 140 135 - 145 mmol/L Final     Comment:     Reference intervals for this test were updated on 09/26/2023 to more accurately reflect our healthy population.  There may be differences in the flagging of prior results with similar values performed with this method. Interpretation of those prior results can be made in the context of the updated reference intervals.    03/16/2024 140 135 - 145 mmol/L Final     Comment:     Reference intervals for this test were updated on 09/26/2023 to more accurately reflect our healthy population. There may be differences in the flagging of prior results with similar values performed with this method. Interpretation of those prior results can be made in the context of the updated reference intervals.      Potassium   Date Value Ref Range Status   04/14/2024 4.6 3.4 - 5.3 mmol/L Final   03/16/2024 4.6 3.4 - 5.3 mmol/L Final     Chloride   Date Value Ref Range Status   04/14/2024 105 98 - 107 mmol/L Final   03/16/2024 103 98 - 107 mmol/L Final     Carbon Dioxide (CO2)   Date Value Ref Range Status   04/14/2024 27 22 - 29 mmol/L Final   03/16/2024 29 22 - 29 mmol/L Final     Anion Gap   Date Value Ref Range Status   04/14/2024 8 7 - 15 mmol/L Final   03/16/2024 8 7 - 15 mmol/L Final     Urea Nitrogen   Date Value Ref Range Status   04/14/2024 5.4 (L) 6.0 - 20.0 mg/dL Final   03/16/2024 9.2 6.0 - 20.0 mg/dL Final     Creatinine   Date Value Ref Range Status   04/14/2024 0.82 0.51 - 0.95 mg/dL Final   03/16/2024 0.86 0.51 - 0.95 mg/dL Final     GFR Estimate   Date Value Ref Range Status   04/14/2024 >90 >60 mL/min/1.73m2 Final   03/16/2024 89 >60 mL/min/1.73m2 Final     Glucose   Date Value Ref Range Status   04/14/2024 73 70 - 99 mg/dL Final   03/16/2024 87 70 - 99 mg/dL Final     Calcium   Date Value Ref Range Status   04/14/2024 8.9 8.6 - 10.0 mg/dL Final   03/16/2024 9.1 8.6 - 10.0 mg/dL Final     CRP Inflammation   Date Value Ref Range Status   12/28/2022 <2.9 0.0 - 8.0 mg/L Final     Inflammatory Markers   CRP Inflammation   Date Value Ref Range Status   12/28/2022 <2.9 0.0 - 8.0 mg/L Final     Hepatic Studies    Bilirubin Total   Date  Value Ref Range Status   04/14/2024 0.6 <=1.2 mg/dL Final   03/16/2024 0.3 <=1.2 mg/dL Final     Alkaline Phosphatase   Date Value Ref Range Status   04/14/2024 42 40 - 150 U/L Final     Comment:     Reference intervals for this test were updated on 11/14/2023 to more accurately reflect our healthy population. There may be differences in the flagging of prior results with similar values performed with this method. Interpretation of those prior results can be made in the context of the updated reference intervals.   03/16/2024 43 40 - 150 U/L Final     Comment:     Reference intervals for this test were updated on 11/14/2023 to more accurately reflect our healthy population. There may be differences in the flagging of prior results with similar values performed with this method. Interpretation of those prior results can be made in the context of the updated reference intervals.     Albumin   Date Value Ref Range Status   04/14/2024 4.3 3.5 - 5.2 g/dL Final   03/16/2024 4.4 3.5 - 5.2 g/dL Final   12/28/2022 4.0 3.4 - 5.0 g/dL Final     AST   Date Value Ref Range Status   04/14/2024 21 0 - 45 U/L Final     Comment:     Reference intervals for this test were updated on 6/12/2023 to more accurately reflect our healthy population. There may be differences in the flagging of prior results with similar values performed with this method. Interpretation of those prior results can be made in the context of the updated reference intervals.   03/16/2024 22 0 - 45 U/L Final     Comment:     Reference intervals for this test were updated on 6/12/2023 to more accurately reflect our healthy population. There may be differences in the flagging of prior results with similar values performed with this method. Interpretation of those prior results can be made in the context of the updated reference intervals.     ALT   Date Value Ref Range Status   04/14/2024 13 0 - 50 U/L Final     Comment:     Reference intervals for this test were  updated on 6/12/2023 to more accurately reflect our healthy population. There may be differences in the flagging of prior results with similar values performed with this method. Interpretation of those prior results can be made in the context of the updated reference intervals.     03/16/2024 16 0 - 50 U/L Final     Comment:     Reference intervals for this test were updated on 6/12/2023 to more accurately reflect our healthy population. There may be differences in the flagging of prior results with similar values performed with this method. Interpretation of those prior results can be made in the context of the updated reference intervals.       Hematology Studies      WBC Count   Date Value Ref Range Status   05/01/2024 4.6 4.0 - 11.0 10e3/uL Final   12/13/2023 6.2 4.0 - 11.0 10e3/uL Final     Hemoglobin   Date Value Ref Range Status   05/01/2024 12.8 11.7 - 15.7 g/dL Final   12/13/2023 13.1 11.7 - 15.7 g/dL Final     Hematocrit   Date Value Ref Range Status   05/01/2024 38.4 35.0 - 47.0 % Final   12/13/2023 40.2 35.0 - 47.0 % Final     Platelet Count   Date Value Ref Range Status   05/01/2024 152 150 - 450 10e3/uL Final   12/13/2023 151 150 - 450 10e3/uL Final     Imaging:  CXR 8/4/23  IMPRESSION: Hyperinflation both lungs. The lungs are otherwise clear. No pleural effusions. Heart size and pulmonary vascularity are within normal limits.       Valentino Landers MD

## 2024-07-29 ENCOUNTER — LAB (OUTPATIENT)
Dept: LAB | Facility: CLINIC | Age: 39
End: 2024-07-29
Payer: COMMERCIAL

## 2024-07-29 ENCOUNTER — OFFICE VISIT (OUTPATIENT)
Dept: RHEUMATOLOGY | Facility: CLINIC | Age: 39
End: 2024-07-29
Attending: INTERNAL MEDICINE
Payer: COMMERCIAL

## 2024-07-29 VITALS
BODY MASS INDEX: 20.11 KG/M2 | DIASTOLIC BLOOD PRESSURE: 60 MMHG | HEIGHT: 67 IN | HEART RATE: 70 BPM | OXYGEN SATURATION: 100 % | SYSTOLIC BLOOD PRESSURE: 90 MMHG | WEIGHT: 128.13 LBS

## 2024-07-29 DIAGNOSIS — R76.12 POSITIVE QUANTIFERON-TB GOLD TEST: ICD-10-CM

## 2024-07-29 DIAGNOSIS — R76.8 RHEUMATOID FACTOR POSITIVE: ICD-10-CM

## 2024-07-29 DIAGNOSIS — Z79.899 LONG-TERM USE OF HIGH-RISK MEDICATION: ICD-10-CM

## 2024-07-29 DIAGNOSIS — R76.8 CYCLIC CITRULLINATED PEPTIDE (CCP) ANTIBODY POSITIVE: ICD-10-CM

## 2024-07-29 DIAGNOSIS — M05.9 SEROPOSITIVE RHEUMATOID ARTHRITIS (H): Primary | ICD-10-CM

## 2024-07-29 DIAGNOSIS — R76.8 SS-A ANTIBODY POSITIVE: ICD-10-CM

## 2024-07-29 DIAGNOSIS — D51.0 PERNICIOUS ANEMIA: ICD-10-CM

## 2024-07-29 DIAGNOSIS — R76.8 POSITIVE ANA (ANTINUCLEAR ANTIBODY): ICD-10-CM

## 2024-07-29 DIAGNOSIS — M05.9 SEROPOSITIVE RHEUMATOID ARTHRITIS (H): ICD-10-CM

## 2024-07-29 LAB
FOLATE SERPL-MCNC: 15.2 NG/ML (ref 4.6–34.8)
VIT B12 SERPL-MCNC: 373 PG/ML (ref 232–1245)
VIT D+METAB SERPL-MCNC: 34 NG/ML (ref 20–50)

## 2024-07-29 PROCEDURE — 99000 SPECIMEN HANDLING OFFICE-LAB: CPT

## 2024-07-29 PROCEDURE — 36415 COLL VENOUS BLD VENIPUNCTURE: CPT

## 2024-07-29 PROCEDURE — 82955 ASSAY OF G6PD ENZYME: CPT | Mod: 90

## 2024-07-29 PROCEDURE — 82306 VITAMIN D 25 HYDROXY: CPT

## 2024-07-29 PROCEDURE — 82607 VITAMIN B-12: CPT

## 2024-07-29 PROCEDURE — 99213 OFFICE O/P EST LOW 20 MIN: CPT | Performed by: INTERNAL MEDICINE

## 2024-07-29 PROCEDURE — 82746 ASSAY OF FOLIC ACID SERUM: CPT

## 2024-07-29 PROCEDURE — G2211 COMPLEX E/M VISIT ADD ON: HCPCS | Performed by: INTERNAL MEDICINE

## 2024-07-29 PROCEDURE — 99214 OFFICE O/P EST MOD 30 MIN: CPT | Performed by: INTERNAL MEDICINE

## 2024-07-29 ASSESSMENT — PAIN SCALES - GENERAL: PAINLEVEL: MILD PAIN (3)

## 2024-07-29 NOTE — NURSING NOTE
"Chief Complaint   Patient presents with    RECHECK     Follow UP      BP 90/60 (BP Location: Left arm, Cuff Size: Adult Regular)   Pulse 70   Ht 1.702 m (5' 7\")   Wt 58.1 kg (128 lb 2 oz)   SpO2 100%   BMI 20.07 kg/m    Ingris Mendoza CMA on 7/29/2024 at 9:29 AM    "

## 2024-07-29 NOTE — LETTER
7/29/2024       RE: Halle Alcantara  [address]  [facility] [address]     Dear Colleague,    Thank you for referring your patient, Halle Alcantara, to the [facility] at [facility]. Please see a copy of my visit note below.                     Chief Complaint/Reason for Visit: seropositive RA    HPI:    Halle Alcantara is a 38 year old White female with past medical history listed below. Reports having pain and swelling of right second PIP and occasional pain of right knee. Denied having morning stiffness. She continues to be on  mg daily.         REVIEW OF SYSTEMS    General - pos for fatigue  HEENT - pos for dry eyes and dry mouth   Cardiovascular - neg for chest pain   Respiratory - neg for cough or sob   Gastrointestinal - denied having bloody diarrhea or difficulty with swallowing, Dx of pernicious anemia  Genitourinary - neg for blood in urine, pain or difficulty with urination   Skin - denied skin rashes, pos for history of vitiligo , neg for malar rash,raynaud's.   Neuro - neg for numbness and tingling, h/o migraine  Hematologic - neg for blood clots  Psych - neg for anxiety or depression   Pregnant? - not currently, had a miscarriage at 12 weeks. Has one child who is 5 years old now - normal delivery, full term healthy baby. She is interested in having another baby in the near future.  Does not smoke, occasionally drinks alcohol. No use of drugs       No past medical history on file.  No past surgical history on file.  Family History   Problem Relation Age of Onset     Thyroid Disease Mother      Rheumatoid Arthritis Father      Diabetes Type 1 Sister      Thyroid Disease Sister      Social History     Socioeconomic History     Marital status:    Tobacco Use     Smoking status: Never     Smokeless tobacco: Never   Substance and Sexual Activity     Alcohol use: Not Currently     Drug use: Yes  "    Sexual activity: Yes     Partners: Male       Allergies   Allergen Reactions     Cephalosporins        Current Outpatient Medications   Medication Sig Dispense Refill     hydroxychloroquine (PLAQUENIL) 200 MG tablet Take 1 tablet (200 mg) by mouth daily Annual Plaquenil toxicity eye screening required. 90 tablet 3     levothyroxine (SYNTHROID/LEVOTHROID) 112 MCG tablet Take 1 Tablet by mouth daily.       Prenatal MV-Min-Fe Fum-FA-DHA (PRENATAL MULTIVITAMIN + DHA PO)        No current facility-administered medications for this visit.       PHYSICAL EXAM    BP 90/60 (BP Location: Left arm, Cuff Size: Adult Regular)   Pulse 70   Ht 1.702 m (5' 7\")   Wt 58.1 kg (128 lb 2 oz)   SpO2 100%   BMI 20.07 kg/m        General: Alert, No apparent distress  Eyes: Sclera noninjected  Skin: No rashes noted  Cardiovascular: Regular rate and rhythm, Normal S1 and S2 and No murmurs, rubs or gallops  Respiratory: Clear to auscultation with no wheezing or crackles  Neuro: Normal gait and Able to arise from seated position unassisted  Musculoskeletal: Hands: Fullness of right third PIP and left second ( tenderness+) and third PIP.  Wrists:  Normal.  No synovitis  Elbows:  Normal.  Shoulders:  Normal.  Feet:  Normal.  Ankles:  Normal.  Knees:  Normal.    LABS   Reviewed as below.   May 2024  CBC-normal  CMP-normal creatinine and LFTs    Dec 2023  CBC - normal wbc, hb and plt  CRP normal    Oct 2023  Ds DNA, c3, c4,CRP normal  UA neg for blood or protein     March 2023  HCV neg     2022  RF 50  CCP 80  QTB pos   HCV neg   Hep B surface ag and core ab neg   MAEGAN 1:160 dense fine speckled   SSA 52 pos   Sm/RNP, SSA 60, SSB, ds DNA neg   Lupus anticoagulant neg   Cardiolipin Ig A and beta 2 glycoprotein Ig G, A and M neg     Exam: TEMPORARY, 10/12/2023 1:20 PM     Indication: Shoulder round cystic-like mass within tendon/muscle  structure, shoulder pain. History of rheumatoid arthritis, rule out RA  nodule, rule out abnormal lymph " node.     Comparison: Chest x-ray 8/4/2023     Technique: Real-time sonographic evaluation of the left shoulder  palpable area. Still and cine images obtained and archived.     Findings:      In the patient's palpable area of concern in the superior left  shoulder there is a circumscribed hypoechoic nodule which measures 0.5  x 0.5 x 0.9 cm. No internal vascularity on color Doppler imaging.      Additional smaller hypoechoic nodule in the anterior left shoulder,  anterior to the second/third rib labeled #2 measures 0.2 x 0.5 x 0.6  cm. No internal vascularity on Doppler imaging.     Multiple additional subcentimeter benign lymph nodes with retained  fatty sariah about the left shoulder.                                                                      Impression:      1. Circumscribed hypoechoic nodule corresponding to the patient's  palpable area of concern in the superior left shoulder which may  represent a rheumatoid nodule.  2. Smaller circumscribed hypoechoic nodule anterior to the  second/third rib may represent a additional rheumatoid nodule versus  reactive lymph node.    ASSESSMENT      1. Seropositive rheumatoid arthritis (H)    2. Rheumatoid factor positive    3. Cyclic citrullinated peptide (CCP) antibody positive    4. Positive MAEGAN (antinuclear antibody)    5. SS-A antibody positive    6. Long-term use of high-risk medication    7. 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 Pernicious anemia            (M05.9) Seropositive rheumatoid arthritis (H)  (primary encounter diagnosis)  (R76.8) Rheumatoid factor positive  (R76.8) Cyclic citrullinated peptide (CCP) antibody positive  Comment: RF 50, CCP-80. X ray hands in 2022 did not show any abnormalities. Currently she is on hydroxychloroquine 200 mg daily. She has her own ophthalmologist at a private practice group in [place]. She may have a mild case of RA. Left shoulder USG - rheumatoid nodule.  Today on exam she does have tenderness and fullness of  PIPs as mentioned above on exam.  Her RA does not seem to be effectively controlled.  Plan:   Continue  mg daily.   If G6PD is normal, will start  mg BID.   Ophthalmology for screening for toxicity from plaquenil - she had a normal eye exam in 2023, she says it was normal.     Orders Placed This Encounter   Procedures     Glucose 6 phosphate dehydrogenase     Vitamin D Deficiency     Vitamin B12     Folate       (R76.8) Positive MAEGAN (antinuclear antibody)  (R76.8) SS-A antibody positive  Comment: MAEGAN 1:160 dense fine speckled, SSA 52 pos. On HCQ.  Plan: continue HCQ     (Z79.899) Long-term use of high-risk medication  Comment: Hydroxychloroquine   Plan: have explained potential side effects of Plaquenil including but not limited to retinal toxicity, need for annual retinal eye exams by an ophthalmologist, skin pigmentation, possible QTc prolongation and cause for cardiac arrhythmias especially with medication interactions, possible GI upset, possible muscle weakness.     (R76.12) Positive QuantiFERON-TB Gold test  Comment: noted, status posttreatment with rifabutin which she completed on on May 16 th 2024.  Plan: Continue ID recommendations    Pernicious anemia  Comment : Dxed by GI   Plan : follow up with GI     RTC - 4 months.    LYNN CUELLAR MD    Division of Rheumatic & Autoimmune Diseases  [facility]        Again, thank you for allowing me to participate in the care of your patient.      Sincerely,    Lynn Cuellar MD

## 2024-07-29 NOTE — PROGRESS NOTES
Chief Complaint/Reason for Visit: seropositive RA    HPI:    Halle Alcantara is a 38 year old White female with past medical history listed below. Reports having pain and swelling of right second PIP and occasional pain of right knee. Denied having morning stiffness. She continues to be on  mg daily.         REVIEW OF SYSTEMS    General - pos for fatigue  HEENT - pos for dry eyes and dry mouth   Cardiovascular - neg for chest pain   Respiratory - neg for cough or sob   Gastrointestinal - denied having bloody diarrhea or difficulty with swallowing, Dx of pernicious anemia  Genitourinary - neg for blood in urine, pain or difficulty with urination   Skin - denied skin rashes, pos for history of vitiligo , neg for malar rash,raynaud's.   Neuro - neg for numbness and tingling, h/o migraine  Hematologic - neg for blood clots  Psych - neg for anxiety or depression   Pregnant? - not currently, had a miscarriage at 12 weeks. Has one child who is 5 years old now - normal delivery, full term healthy baby. She is interested in having another baby in the near future.  Does not smoke, occasionally drinks alcohol. No use of drugs       No past medical history on file.  No past surgical history on file.  Family History   Problem Relation Age of Onset    Thyroid Disease Mother     Rheumatoid Arthritis Father     Diabetes Type 1 Sister     Thyroid Disease Sister      Social History     Socioeconomic History    Marital status:    Tobacco Use    Smoking status: Never    Smokeless tobacco: Never   Substance and Sexual Activity    Alcohol use: Not Currently    Drug use: Yes    Sexual activity: Yes     Partners: Male       Allergies   Allergen Reactions    Cephalosporins        Current Outpatient Medications   Medication Sig Dispense Refill    hydroxychloroquine (PLAQUENIL) 200 MG tablet Take 1 tablet (200 mg) by mouth daily Annual Plaquenil toxicity eye screening required. 90 tablet 3     "levothyroxine (SYNTHROID/LEVOTHROID) 112 MCG tablet Take 1 Tablet by mouth daily.      Prenatal MV-Min-Fe Fum-FA-DHA (PRENATAL MULTIVITAMIN + DHA PO)        No current facility-administered medications for this visit.       PHYSICAL EXAM    BP 90/60 (BP Location: Left arm, Cuff Size: Adult Regular)   Pulse 70   Ht 1.702 m (5' 7\")   Wt 58.1 kg (128 lb 2 oz)   SpO2 100%   BMI 20.07 kg/m        General: Alert, No apparent distress  Eyes: Sclera noninjected  Skin: No rashes noted  Cardiovascular: Regular rate and rhythm, Normal S1 and S2 and No murmurs, rubs or gallops  Respiratory: Clear to auscultation with no wheezing or crackles  Neuro: Normal gait and Able to arise from seated position unassisted  Musculoskeletal: Hands: Fullness of right third PIP and left second ( tenderness+) and third PIP.  Wrists:  Normal.  No synovitis  Elbows:  Normal.  Shoulders:  Normal.  Feet:  Normal.  Ankles:  Normal.  Knees:  Normal.    LABS   Reviewed as below.   May 2024  CBC-normal  CMP-normal creatinine and LFTs    Dec 2023  CBC - normal wbc, hb and plt  CRP normal    Oct 2023  Ds DNA, c3, c4,CRP normal  UA neg for blood or protein     March 2023  HCV neg     2022  RF 50  CCP 80  QTB pos   HCV neg   Hep B surface ag and core ab neg   MAEGAN 1:160 dense fine speckled   SSA 52 pos   Sm/RNP, SSA 60, SSB, ds DNA neg   Lupus anticoagulant neg   Cardiolipin Ig A and beta 2 glycoprotein Ig G, A and M neg     Exam: TEMPORARY, 10/12/2023 1:20 PM     Indication: Shoulder round cystic-like mass within tendon/muscle  structure, shoulder pain. History of rheumatoid arthritis, rule out RA  nodule, rule out abnormal lymph node.     Comparison: Chest x-ray 8/4/2023     Technique: Real-time sonographic evaluation of the left shoulder  palpable area. Still and cine images obtained and archived.     Findings:      In the patient's palpable area of concern in the superior left  shoulder there is a circumscribed hypoechoic nodule which measures " 0.5  x 0.5 x 0.9 cm. No internal vascularity on color Doppler imaging.      Additional smaller hypoechoic nodule in the anterior left shoulder,  anterior to the second/third rib labeled #2 measures 0.2 x 0.5 x 0.6  cm. No internal vascularity on Doppler imaging.     Multiple additional subcentimeter benign lymph nodes with retained  fatty sariah about the left shoulder.                                                                      Impression:      1. Circumscribed hypoechoic nodule corresponding to the patient's  palpable area of concern in the superior left shoulder which may  represent a rheumatoid nodule.  2. Smaller circumscribed hypoechoic nodule anterior to the  second/third rib may represent a additional rheumatoid nodule versus  reactive lymph node.    ASSESSMENT      1. Seropositive rheumatoid arthritis (H)    2. Rheumatoid factor positive    3. Cyclic citrullinated peptide (CCP) antibody positive    4. Positive MAEGAN (antinuclear antibody)    5. SS-A antibody positive    6. Long-term use of high-risk medication    7. 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 Pernicious anemia            (M05.9) Seropositive rheumatoid arthritis (H)  (primary encounter diagnosis)  (R76.8) Rheumatoid factor positive  (R76.8) Cyclic citrullinated peptide (CCP) antibody positive  Comment: RF 50, CCP-80. X ray hands in 2022 did not show any abnormalities. Currently she is on hydroxychloroquine 200 mg daily. She has her own ophthalmologist at a private practice group in [place]. She may have a mild case of RA. Left shoulder USG - rheumatoid nodule.  Today on exam she does have tenderness and fullness of PIPs as mentioned above on exam.  Her RA does not seem to be effectively controlled.  Plan:   Continue  mg daily.   If G6PD is normal, will start  mg BID.   Ophthalmology for screening for toxicity from plaquenil - she had a normal eye exam in 2023, she says it was normal.     Orders Placed This Encounter    Procedures    Glucose 6 phosphate dehydrogenase    Vitamin D Deficiency    Vitamin B12    Folate       (R76.8) Positive MAEGAN (antinuclear antibody)  (R76.8) SS-A antibody positive  Comment: MAEGAN 1:160 dense fine speckled, SSA 52 pos. On HCQ.  Plan: continue HCQ     (Z79.899) Long-term use of high-risk medication  Comment: Hydroxychloroquine   Plan: have explained potential side effects of Plaquenil including but not limited to retinal toxicity, need for annual retinal eye exams by an ophthalmologist, skin pigmentation, possible QTc prolongation and cause for cardiac arrhythmias especially with medication interactions, possible GI upset, possible muscle weakness.     (R76.12) Positive QuantiFERON-TB Gold test  Comment: noted, status posttreatment with rifabutin which she completed on on May 16 th 2024.  Plan: Continue ID recommendations    Pernicious anemia  Comment : Dxed by GI   Plan : follow up with GI     RTC - 4 months.    GALLO CUELLAR MD    Division of Rheumatic & Autoimmune Diseases  [facility]

## 2024-07-29 NOTE — PATIENT INSTRUCTIONS
Brand Names: US  Azulfidine; Azulfidine EN-tabs  Brand Names: [place]  PMS-Sulfasalazine; Salazopyrin  What is this drug used for?   It is used to treat rheumatoid arthritis.   It is used to treat ulcerative colitis.   It may be given to you for other reasons. Talk with the doctor.  What do I need to tell my doctor BEFORE I take this drug?   If you are allergic to this drug; any part of this drug; or any other drugs, foods, or substances. Tell your doctor about the allergy and what signs you had.   If you have a sulfa allergy.   If you have any of these health problems: Bowel block, porphyria, or trouble passing urine.  This is not a list of all drugs or health problems that interact with this drug.  Tell your doctor and pharmacist about all of your drugs (prescription or OTC, natural products, vitamins) and health problems. You must check to make sure that it is safe for you to take this drug with all of your drugs and health problems. Do not start, stop, or change the dose of any drug without checking with your doctor.  What are some things I need to know or do while I take this drug?  All products:   Tell all of your health care providers that you take this drug. This includes your doctors, nurses, pharmacists, and dentists.   If you have asthma, talk with your doctor. You may be more sensitive to this drug.   Be careful if you have low levels of an enzyme called G6PD. Anemia may happen. Low levels of G6PD may be more likely in patients of , South , , and Mediterranean descent.   Have blood work checked as you have been told by the doctor. Talk with the doctor.   Have your urine checked as you have been told by your doctor.   This drug may affect certain lab tests. Tell all of your health care providers and lab workers that you take this drug.   This drug may change the color of urine or skin to a yellow or orange color. This is not harmful.   Very bad and sometimes deadly allergic  reactions, infections, heart problems, kidney problems, liver problems, lung problems, and blood problems have happened with this drug. Nerve or muscle problems that have not gone away have also happened with this drug. Talk with the doctor.   Sperm problems have happened while taking this drug. This may affect being able to father a child. This may go back to normal after the drug is stopped. If you have questions, talk with the doctor.   Tell your doctor if you are pregnant, plan on getting pregnant, or are breast-feeding. You will need to talk about the benefits and risks to you and the baby.  Delayed-release tablets:   You may see something that looks like the tablet in your stool. If this happens, talk with your doctor.  What are some side effects that I need to call my doctor about right away?  WARNING/CAUTION: Even though it may be rare, some people may have very bad and sometimes deadly side effects when taking a drug. Tell your doctor or get medical help right away if you have any of the following signs or symptoms that may be related to a very bad side effect:   Signs of an allergic reaction, like rash; hives; itching; red, swollen, blistered, or peeling skin with or without fever; wheezing; tightness in the chest or throat; trouble breathing, swallowing, or talking; unusual hoarseness; or swelling of the mouth, face, lips, tongue, or throat.   Signs of liver problems like dark urine, tiredness, decreased appetite, upset stomach or stomach pain, light-colored stools, throwing up, or yellow skin or eyes.   Signs of kidney problems like unable to pass urine, change in how much urine is passed, blood in the urine, or a big weight gain.   Signs of infection like fever, chills, very bad sore throat, ear or sinus pain, cough, more sputum or change in color of sputum, pain with passing urine, mouth sores, or wound that will not heal.   Feeling very tired or weak.   Pale skin.   Any unexplained bruising or  bleeding.   Swollen gland.   Shortness of breath, a big weight gain, or swelling in the arms or legs.   Severe skin reactions have happened with this drug. These have included Bradley-Feroz syndrome (SJS), toxic epidermal necrolysis (TEN), and other severe skin reactions. Sometimes these have been deadly. Get medical help right away if you have signs like red, swollen, blistered, or peeling skin; other skin irritation (with or without fever); red or irritated eyes; or sores in your mouth, throat, nose, or eyes.  What are some other side effects of this drug?  All drugs may cause side effects. However, many people have no side effects or only have minor side effects. Call your doctor or get medical help if any of these side effects or any other side effects bother you or do not go away:   Headache.   Stomach pain or heartburn.   Upset stomach or throwing up.   Decreased appetite.  These are not all of the side effects that may occur. If you have questions about side effects, call your doctor. Call your doctor for medical advice about side effects.  You may report side effects to your national health agency.  How is this drug best taken?  Use this drug as ordered by your doctor. Read all information given to you. Follow all instructions closely.  All products:   Take after meals.   Keep taking this drug as you have been told by your doctor or other health care provider, even if you feel well.   Drink lots of noncaffeine liquids unless told to drink less liquid by your doctor.  Delayed-release tablets:   Swallow whole. Do not chew, break, or crush.  What do I do if I miss a dose?   Take a missed dose as soon as you think about it.   If it is close to the time for your next dose, skip the missed dose and go back to your normal time.   Do not take 2 doses at the same time or extra doses.  How do I store and/or throw out this drug?   Store at room temperature in a dry place. Do not store in a bathroom.   Keep all drugs  in a safe place. Keep all drugs out of the reach of children and pets.   Throw away unused or  drugs. Do not flush down a toilet or pour down a drain unless you are told to do so. Check with your pharmacist if you have questions about the best way to throw out drugs. There may be drug take-back programs in your area.  General drug facts   If your symptoms or health problems do not get better or if they become worse, call your doctor.   Do not share your drugs with others and do not take anyone else's drugs.   Some drugs may have another patient information leaflet. If you have any questions about this drug, please talk with your doctor, nurse, pharmacist, or other health care provider.   If you think there has been an overdose, call your poison control center or get medical care right away. Be ready to tell or show what was taken, how much, and when it happened.  Last Reviewed Date  2022-11-15  Consumer Information Use and Disclaimer  This generalized information is a limited summary of diagnosis, treatment, and/or medication information. It is not meant to be comprehensive and should be used as a tool to help the user understand and/or assess potential diagnostic and treatment options. It does NOT include all information about conditions, treatments, medications, side effects, or risks that may apply to a specific patient. It is not intended to be medical advice or a substitute for the medical advice, diagnosis, or treatment of a health care provider based on the health care provider's examination and assessment of a patient's specific and unique circumstances. Patients must speak with a health care provider for complete information about their health, medical questions, and treatment options, including any risks or benefits regarding use of medications. This information does not endorse any treatments or medications as safe, effective, or approved for treating a specific patient. Up[employer], Inc. and its  affiliates disclaim any warranty or liability relating to this information or the use thereof. The use of this information is governed by the Terms of Use, available at https://www.woltersNoveluwer.com/en/know/clinical-effectiveness-terms.    2024 [employer] and its affiliates and/or licensors. All rights reserved.  Use of [employer] is subject to the Terms of Use.

## 2024-07-30 LAB — G6PD RBC-CCNT: 11.7 U/G HB

## 2024-08-05 DIAGNOSIS — M05.9 SEROPOSITIVE RHEUMATOID ARTHRITIS (H): Primary | ICD-10-CM

## 2024-08-07 ENCOUNTER — TELEPHONE (OUTPATIENT)
Dept: RHEUMATOLOGY | Facility: CLINIC | Age: 39
End: 2024-08-07
Payer: COMMERCIAL

## 2024-08-07 NOTE — TELEPHONE ENCOUNTER
MTM referral from: [facility] visit (referral by provider)    MTM referral outreach attempt #2 on August 7, 2024 at 9:00 AM      Outcome: Patient not reachable after several attempts, routed to Pharmacist Team/Provider as an FYI    Use hbc for the carrier/Plan on the flowsheet      [employer] Message Sent    Honey Boogie CPhT  MTM

## 2024-08-12 NOTE — TELEPHONE ENCOUNTER
MTM Referral outreach attempt #3 was made on 08/12/2024.    Outcome: Sent patient MyChart message explaining more in-depth what MTM is and how we can best support them. Attached ability to schedule from message, as well as offered opportunity to call me directly for help with scheduling.

## 2024-08-19 ENCOUNTER — TELEPHONE (OUTPATIENT)
Dept: RHEUMATOLOGY | Facility: CLINIC | Age: 39
End: 2024-08-19
Payer: COMMERCIAL

## 2024-08-19 NOTE — TELEPHONE ENCOUNTER
Called pt to complete RAPID-3 assessment for upcoming new MTM appointment on 8/21    RAPID-3 assessment is complete and routed to MTM, Ethel OSPINA

## 2024-08-19 NOTE — TELEPHONE ENCOUNTER
Please select the one best answer for the patient's ability at this time     Dress yourself including tying shoelaces and doing buttons?    With some difficulty: 0.33   Get in and out of bed   With out difficulty: 0   Lift a full cup or glass to your mouth  With out difficulty: 0   Walk outdoors on flat ground    With some difficulty: 0.33   Wash and dry your whole body    With some difficulty: 0.33   Bend down to  clothing from the ground    With out difficulty: 0   Turn regular faucets on and off    With out difficulty: 0   Get out of a car, bus, train, airplane   With some difficulty: 0.33   Walk 2 miles or 3 kilometers, if you wish?    With out difficulty: 0   Participate in recreational activities and sports as you would like if you wish?   With some difficulty: 0.33   How much pain have you had because of your condition in the last week? Please indicate how severe your pain has been on a scale of 0-10 (with 0 being no pain at all and 10 being pain as worse as it could be)  3  Considering all the ways in which illness and health conditions may affect you at this time, please indicate how you are doing on the scale of 0-10 (with 0 being very well and 10 being very poor)  2    Score: 6.65  0-3 Near remission  3.1-6 Low  6.1-12 Moderate  12.1-30 High

## 2024-08-21 ENCOUNTER — VIRTUAL VISIT (OUTPATIENT)
Dept: RHEUMATOLOGY | Facility: CLINIC | Age: 39
End: 2024-08-21
Attending: INTERNAL MEDICINE
Payer: COMMERCIAL

## 2024-08-21 DIAGNOSIS — M05.9 SEROPOSITIVE RHEUMATOID ARTHRITIS (H): Primary | ICD-10-CM

## 2024-08-21 RX ORDER — SULFASALAZINE 500 MG/1
500 TABLET, DELAYED RELEASE ORAL 2 TIMES DAILY
Qty: 60 TABLET | Refills: 2 | Status: SHIPPED | OUTPATIENT
Start: 2024-08-21

## 2024-08-21 NOTE — Clinical Note
8/21/2024       RE: Halle Alcantara  [address]  [facility] [address]     Dear Colleague,    Thank you for referring your patient, Halle Alcantara, to the [facility] [place] [facility]. Please see a copy of my visit note below.    Medication Therapy Management (MTM) Encounter    ASSESSMENT:                            Medication Adherence/Access: No issues identified.    Rheumatoid arthritis: Patient's symptoms are not adequately controlled on hydroxychloroquine monotherapy, G6PD result is within normal limits. Sulfasalazine education provided today including administration, monitoring, common and serious side effects (including risk of infection and small risk of malignancy), and time to effectiveness. Note patient is not actively preventing pregnancy, per ACR recommendation sulfasalazine can be continued during pregnancy and breastfeeding. Asked that patient let us know if she does become pregnant as additional folic acid supplementation may be needed.    PLAN:                            Start sulfasalazine 500 mg twice daily. We will repeat labs in 4 weeks and consider dose increase if needed and tolerating at that time.  Continue hydroxychloroquine 200 mg daily.  Please let us know if you become pregnant.    Follow-up: 4 weeks for lab update and tolerability.    SUBJECTIVE/OBJECTIVE:                          Patricia Alcantara is a 39 year old female called for an initial visit. She was referred to me from Lynn Garcia MD.    Reason for visit: Sulfasalazine start.    Allergies/ADRs: Reviewed in chart  Past Medical History: Reviewed in chart  Tobacco: She reports that she has never smoked. She has never used smokeless tobacco.  Alcohol: not assessed today    Medication Adherence/Access: No issues identified.    Rheumatoid arthritis:   Hydroxychloroquine 200 mg daily    Saw provider 7/29. Currently taking  hydroxychloroquine with insufficient control of pain and swelling in fingers and knee. Note she and her partner are not actively preventing pregnancy at this time. Is taking a prenatal vitamin.    Component      Latest Ref Rng 4/14/2024  11:30 AM 5/1/2024  9:06 AM 7/29/2024  4:13 PM   WBC      4.0 - 11.0 10e3/uL  4.6     RBC Count      3.80 - 5.20 10e6/uL  4.38     Hemoglobin      11.7 - 15.7 g/dL  12.8     Hematocrit      35.0 - 47.0 %  38.4     MCV      78 - 100 fL  88     MCH      26.5 - 33.0 pg  29.2     MCHC      31.5 - 36.5 g/dL  33.3     RDW      10.0 - 15.0 %  13.4     Platelet Count      150 - 450 10e3/uL  152     % Neutrophils      %  60     % Lymphocytes      %  25     % Monocytes      %  10     % Eosinophils      %  4     % Basophils      %  2     % Immature Granulocytes      %  0     Absolute Neutrophils      1.6 - 8.3 10e3/uL  2.8     Absolute Lymphocytes      0.8 - 5.3 10e3/uL  1.2     Absolute Monocytes      0.0 - 1.3 10e3/uL  0.4     Absolute Eosinophils      0.0 - 0.7 10e3/uL  0.2     Absolute Basophils      0.0 - 0.2 10e3/uL  0.1     Absolute Immature Granulocytes      <=0.4 10e3/uL  0.0     Sodium      135 - 145 mmol/L 140      Potassium      3.4 - 5.3 mmol/L 4.6      Carbon Dioxide (CO2)      22 - 29 mmol/L 27      Anion Gap      7 - 15 mmol/L 8      Urea Nitrogen      6.0 - 20.0 mg/dL 5.4 (L)      Creatinine      0.51 - 0.95 mg/dL 0.82      GFR Estimate      >60 mL/min/1.73m2 >90      Calcium      8.6 - 10.0 mg/dL 8.9      Chloride      98 - 107 mmol/L 105      Glucose      70 - 99 mg/dL 73      Alkaline Phosphatase      40 - 150 U/L 42      AST      0 - 45 U/L 21      ALT      0 - 50 U/L 13      Protein Total      6.4 - 8.3 g/dL 6.7      Albumin      3.5 - 5.2 g/dL 4.3      Bilirubin Total      <=1.2 mg/dL 0.6      CRP Inflammation      <5.00 mg/L  <3.00     Glucose-6-PO4 Dehydrogenase      9.9 - 16.6 U/g Hb   11.7       Today's Vitals: There were no vitals taken for this  visit.  ----------------    I spent 20 minutes with this patient today. All changes were made via collaborative practice agreement with Lnyn Garcia. A copy of the visit note was provided to the patient's provider(s).    A summary of these recommendations was sent via [employer]us.    Ethel Aponte, PharmD  Medication Therapy Management Pharmacist  [facility]     Telemedicine Visit Details  Type of service:  Telephone visit  Start Time:  0830  End Time:  0850     Medication Therapy Recommendations  No medication therapy recommendations to display       Again, thank you for allowing me to participate in the care of your patient.      Sincerely,    Ethel Aponte, [facility]

## 2024-08-21 NOTE — PATIENT INSTRUCTIONS
"Recommendations from today's MTM visit:                                                      Start sulfasalazine 500 mg twice daily. We will repeat labs in 4 weeks and consider dose increase if needed and tolerating at that time.  Continue hydroxychloroquine 200 mg daily.  Please let us know if you become pregnant.    Follow-up: 4 weeks for lab update and tolerability.    It was great speaking with you today.  I value your experience and would be very thankful for your time in providing feedback in our clinic survey. In the next few days, you may receive an email or text message from [employer] with a link to a survey related to your  clinical pharmacist.\"     To schedule another MTM appointment, please call the clinic directly or you may call the MTM scheduling line at [phone] or toll-free at [phone].     My Clinical Pharmacist's contact information:                                                      Please feel free to contact me with any questions or concerns you have.      Ethel Aponte, PharmD  Medication Therapy Management Pharmacist  [facility]    "

## 2024-08-21 NOTE — PROGRESS NOTES
Medication Therapy Management (MTM) Encounter    ASSESSMENT:                            Medication Adherence/Access: No issues identified.    Rheumatoid arthritis: Patient's symptoms are not adequately controlled on hydroxychloroquine monotherapy, G6PD result is within normal limits. Sulfasalazine education provided today including administration, monitoring, common and serious side effects (including risk of infection and small risk of malignancy), and time to effectiveness. Note patient is not actively preventing pregnancy, per ACR recommendation sulfasalazine can be continued during pregnancy and breastfeeding. Asked that patient let us know if she does become pregnant as additional folic acid supplementation may be needed.    Hypothyroidism: TSH is in goal on current levothyroxine dose.     PLAN:                            Start sulfasalazine 500 mg twice daily. We will repeat labs in 4 weeks and consider dose increase if needed and tolerating at that time.  Continue hydroxychloroquine 200 mg daily.  Please let us know if you become pregnant.    Follow-up: 4 weeks for lab update and tolerability.    SUBJECTIVE/OBJECTIVE:                          Patricia Alcantara is a 39 year old female called for an initial visit. She was referred to me from Lynn Garcia MD.    Reason for visit: Sulfasalazine start.    Allergies/ADRs: Reviewed in chart  Past Medical History: Reviewed in chart  Tobacco: She reports that she has never smoked. She has never used smokeless tobacco.  Alcohol: not assessed today    Medication Adherence/Access: No issues identified.    Rheumatoid arthritis:   Hydroxychloroquine 200 mg daily    Saw provider 7/29. Currently taking hydroxychloroquine with insufficient control of pain and swelling in fingers and knee. Note she and her partner are not actively preventing pregnancy at this time. Is taking a prenatal vitamin.    Component      Latest Ref Rng 4/14/2024  11:30 AM 5/1/2024  9:06 AM  7/29/2024  4:13 PM   WBC      4.0 - 11.0 10e3/uL  4.6     RBC Count      3.80 - 5.20 10e6/uL  4.38     Hemoglobin      11.7 - 15.7 g/dL  12.8     Hematocrit      35.0 - 47.0 %  38.4     MCV      78 - 100 fL  88     MCH      26.5 - 33.0 pg  29.2     MCHC      31.5 - 36.5 g/dL  33.3     RDW      10.0 - 15.0 %  13.4     Platelet Count      150 - 450 10e3/uL  152     % Neutrophils      %  60     % Lymphocytes      %  25     % Monocytes      %  10     % Eosinophils      %  4     % Basophils      %  2     % Immature Granulocytes      %  0     Absolute Neutrophils      1.6 - 8.3 10e3/uL  2.8     Absolute Lymphocytes      0.8 - 5.3 10e3/uL  1.2     Absolute Monocytes      0.0 - 1.3 10e3/uL  0.4     Absolute Eosinophils      0.0 - 0.7 10e3/uL  0.2     Absolute Basophils      0.0 - 0.2 10e3/uL  0.1     Absolute Immature Granulocytes      <=0.4 10e3/uL  0.0     Sodium      135 - 145 mmol/L 140      Potassium      3.4 - 5.3 mmol/L 4.6      Carbon Dioxide (CO2)      22 - 29 mmol/L 27      Anion Gap      7 - 15 mmol/L 8      Urea Nitrogen      6.0 - 20.0 mg/dL 5.4 (L)      Creatinine      0.51 - 0.95 mg/dL 0.82      GFR Estimate      >60 mL/min/1.73m2 >90      Calcium      8.6 - 10.0 mg/dL 8.9      Chloride      98 - 107 mmol/L 105      Glucose      70 - 99 mg/dL 73      Alkaline Phosphatase      40 - 150 U/L 42      AST      0 - 45 U/L 21      ALT      0 - 50 U/L 13      Protein Total      6.4 - 8.3 g/dL 6.7      Albumin      3.5 - 5.2 g/dL 4.3      Bilirubin Total      <=1.2 mg/dL 0.6      CRP Inflammation      <5.00 mg/L  <3.00     Glucose-6-PO4 Dehydrogenase      9.9 - 16.6 U/g Hb   11.7       Hypothyroidism:   Levothyroxine 112 mcg daily    Separates Synthroid as directed from other medications.    TSH 3.65 6/14/24    Today's Vitals: There were no vitals taken for this visit.  ----------------    I spent 20 minutes with this patient today. All changes were made via collaborative practice agreement with Lynn Gacria. A copy of  the visit note was provided to the patient's provider(s).    A summary of these recommendations was sent via [employer].    Anabell TreadwellD  Medication Therapy Management Pharmacist  [facility]     Telemedicine Visit Details  Type of service:  Telephone visit  Start Time:  0830  End Time:  0850     Medication Therapy Recommendations  No medication therapy recommendations to display

## 2024-10-28 DIAGNOSIS — M05.79 RHEUMATOID ARTHRITIS INVOLVING MULTIPLE SITES WITH POSITIVE RHEUMATOID FACTOR (H): ICD-10-CM

## 2024-10-28 RX ORDER — HYDROXYCHLOROQUINE SULFATE 200 MG/1
200 TABLET, FILM COATED ORAL DAILY
Qty: 90 TABLET | Refills: 3 | Status: CANCELLED | OUTPATIENT
Start: 2024-10-28

## 2024-10-31 ENCOUNTER — MYC REFILL (OUTPATIENT)
Dept: RHEUMATOLOGY | Facility: CLINIC | Age: 39
End: 2024-10-31
Payer: COMMERCIAL

## 2024-10-31 DIAGNOSIS — M05.79 RHEUMATOID ARTHRITIS INVOLVING MULTIPLE SITES WITH POSITIVE RHEUMATOID FACTOR (H): ICD-10-CM

## 2024-10-31 RX ORDER — HYDROXYCHLOROQUINE SULFATE 200 MG/1
200 TABLET, FILM COATED ORAL DAILY
Qty: 90 TABLET | Refills: 0 | Status: SHIPPED | OUTPATIENT
Start: 2024-10-31

## 2024-10-31 NOTE — TELEPHONE ENCOUNTER
hydroxychloroquine (PLAQUENIL) 200 MG tablet       Last Written Prescription Date:  8/30/23  Last Fill Quantity: 90,   # refills: 3  Last Office Visit : 7/29/24 Jose  Future Office visit:  11/25/24  Creatinine   Date Value Ref Range Status   04/14/2024 0.82 0.51 - 0.95 mg/dL Final    Last eye exam per patient report 2023 said it was normal    Routing refill request to provider for review/approval because:  No eye exam on record, Does have f/u appt booked for 11/25/24 with Rheum provider/Dr. Garcia out of office through 11/5/24. Routed to on call provider.   Tona VALDOVINOS RN  P Central Nursing/Red Flag Triage & Med Refill Team

## 2024-10-31 NOTE — TELEPHONE ENCOUNTER
Medication Requested:    Disp Refills Start End AIDE   hydroxychloroquine (PLAQUENIL) 200 MG tablet 90 tablet 3 8/30/2023 -- No   Sig - Route: Take 1 tablet (200 mg) by mouth daily Annual Plaquenil toxicity eye screening required. - Oral     Last Office Visit : 7/29/2024  [facility] Rhe[facility]    Future Office visit:     11/25/2024 9:30 AM (30 min)  Kayla   Arrive by:  9:15 AM   RETURN RHEUMATOLOGY   [facility] ([place])   Lynn Garcia MD       Passed Rheumatology medication refill protocol:   - Eye exam    Date: 1/2/24  Clinic: [facility]  Source: Media    Creatinine:   Creatinine   Date Value Ref Range Status   04/14/2024 0.82 0.51 - 0.95 mg/dL Final

## 2024-11-01 NOTE — TELEPHONE ENCOUNTER
There is an eye exam on file from May 2024, it was for a regular eye exam but looks like they also discussed her plaquenil use?

## 2024-11-25 ENCOUNTER — OFFICE VISIT (OUTPATIENT)
Dept: RHEUMATOLOGY | Facility: CLINIC | Age: 39
End: 2024-11-25
Attending: INTERNAL MEDICINE
Payer: COMMERCIAL

## 2024-11-25 VITALS
WEIGHT: 132.5 LBS | SYSTOLIC BLOOD PRESSURE: 99 MMHG | HEART RATE: 66 BPM | BODY MASS INDEX: 20.75 KG/M2 | DIASTOLIC BLOOD PRESSURE: 65 MMHG | OXYGEN SATURATION: 100 %

## 2024-11-25 DIAGNOSIS — Z79.899 LONG-TERM USE OF HIGH-RISK MEDICATION: ICD-10-CM

## 2024-11-25 DIAGNOSIS — R76.8 POSITIVE ANA (ANTINUCLEAR ANTIBODY): ICD-10-CM

## 2024-11-25 DIAGNOSIS — D51.0 PERNICIOUS ANEMIA: ICD-10-CM

## 2024-11-25 DIAGNOSIS — M05.9 SEROPOSITIVE RHEUMATOID ARTHRITIS (H): Primary | ICD-10-CM

## 2024-11-25 DIAGNOSIS — R76.8 CYCLIC CITRULLINATED PEPTIDE (CCP) ANTIBODY POSITIVE: ICD-10-CM

## 2024-11-25 DIAGNOSIS — R76.8 RHEUMATOID FACTOR POSITIVE: ICD-10-CM

## 2024-11-25 DIAGNOSIS — R76.8 SS-A ANTIBODY POSITIVE: ICD-10-CM

## 2024-11-25 DIAGNOSIS — R76.12 POSITIVE QUANTIFERON-TB GOLD TEST: ICD-10-CM

## 2024-11-25 PROCEDURE — G2211 COMPLEX E/M VISIT ADD ON: HCPCS | Performed by: INTERNAL MEDICINE

## 2024-11-25 PROCEDURE — 99213 OFFICE O/P EST LOW 20 MIN: CPT | Performed by: INTERNAL MEDICINE

## 2024-11-25 PROCEDURE — 99214 OFFICE O/P EST MOD 30 MIN: CPT | Performed by: INTERNAL MEDICINE

## 2024-11-25 ASSESSMENT — PAIN SCALES - GENERAL: PAINLEVEL_OUTOF10: MILD PAIN (3)

## 2024-11-25 NOTE — PROGRESS NOTES
Chief Complaint/Reason for Visit: seropositive RA    HPI:    Halle Alcantara is a 38 year old White female with past medical history listed below. Reports morning stiffness > 1 hour. Has pain of left 3rd digit with swelling. She however, thinks her symptoms are manageable being on HCQ alone.  She was prescribed sulfasalazine in August but she would never took it because she was scared of side effects as she found out that she was pregnant.  She follows up with her OB at [facility].  Continues to be on Plaquenil 200 mg daily.      REVIEW OF SYSTEMS    General - pos for fatigue  HEENT - pos for dry eyes and dry mouth   Cardiovascular - neg for chest pain   Respiratory - neg for cough or sob   Gastrointestinal - denied having bloody diarrhea or difficulty with swallowing, Dx of pernicious anemia  Genitourinary - neg for blood in urine, pain or difficulty with urination   Skin - denied skin rashes, pos for history of vitiligo , neg for malar rash,raynaud's.   Neuro - neg for numbness and tingling, h/o migraine  Hematologic - neg for blood clots  Psych - neg for anxiety or depression   Pregnant? - at 17 weeks, In the past, she had a miscarriage at 12 weeks. Has one child (boy) who is 5 years old now - normal delivery, full term healthy baby. Does not smoke, occasionally drinks alcohol. No use of drugs       No past medical history on file.  No past surgical history on file.  Family History   Problem Relation Age of Onset    Thyroid Disease Mother     Rheumatoid Arthritis Father     Diabetes Type 1 Sister     Thyroid Disease Sister      Social History     Socioeconomic History    Marital status:    Tobacco Use    Smoking status: Never    Smokeless tobacco: Never   Substance and Sexual Activity    Alcohol use: Not Currently    Drug use: Yes    Sexual activity: Yes     Partners: Male       Allergies   Allergen Reactions    Cephalosporins        Current Outpatient Medications   Medication  Sig Dispense Refill    hydroxychloroquine (PLAQUENIL) 200 MG tablet Take 1 tablet (200 mg) by mouth daily. *Due for Annual Plaquenil toxicity eye screening in January 90 tablet 0    levothyroxine (SYNTHROID/LEVOTHROID) 112 MCG tablet Take 1 Tablet by mouth daily.      Prenatal MV-Min-Fe Fum-FA-DHA (PRENATAL MULTIVITAMIN + DHA PO)        No current facility-administered medications for this visit.       PHYSICAL EXAM    BP 99/65 (BP Location: Right arm, Patient Position: Sitting, Cuff Size: Adult Regular)   Pulse 66   Wt 60.1 kg (132 lb 8 oz)   LMP 04/28/2024 (Exact Date)   SpO2 100%   BMI 20.75 kg/m        General: Alert, No apparent distress  Eyes: Sclera noninjected  Skin: No rashes noted  Cardiovascular: Regular rate and rhythm, Normal S1 and S2 and No murmurs, rubs or gallops  Respiratory: Clear to auscultation with no wheezing or crackles  Neuro: Normal gait and Able to arise from seated position unassisted  Musculoskeletal: Hands: Puffiness of right third PIP and left second and third PIP(tender).  Wrists:  Normal.  No synovitis  Elbows:  Normal.  Shoulders:  Normal.  Feet:  Normal.  Ankles:  Normal.  Knees:  Normal.    LABS   Reviewed as below.     Sept 2024  CBC - normal wbc, Hb and plt low at 146k    April 2024  LFT, creatinine normal     Dec 2023  CBC - normal wbc, hb and plt  CRP normal    Oct 2023  Ds DNA, c3, c4,CRP normal  UA neg for blood or protein     March 2023  HCV neg     2022  RF 50  CCP 80  QTB pos   HCV neg   Hep B surface ag and core ab neg   MAEGAN 1:160 dense fine speckled   SSA 52 pos   Sm/RNP, SSA 60, SSB, ds DNA neg   Lupus anticoagulant neg   Cardiolipin Ig A and beta 2 glycoprotein Ig G, A and M neg     Exam: TEMPORARY, 10/12/2023 1:20 PM     Indication: Shoulder round cystic-like mass within tendon/muscle  structure, shoulder pain. History of rheumatoid arthritis, rule out RA  nodule, rule out abnormal lymph node.     Comparison: Chest x-ray 8/4/2023     Technique: Real-time  sonographic evaluation of the left shoulder  palpable area. Still and cine images obtained and archived.     Findings:      In the patient's palpable area of concern in the superior left  shoulder there is a circumscribed hypoechoic nodule which measures 0.5  x 0.5 x 0.9 cm. No internal vascularity on color Doppler imaging.      Additional smaller hypoechoic nodule in the anterior left shoulder,  anterior to the second/third rib labeled #2 measures 0.2 x 0.5 x 0.6  cm. No internal vascularity on Doppler imaging.     Multiple additional subcentimeter benign lymph nodes with retained  fatty sariah about the left shoulder.                                                                      Impression:      1. Circumscribed hypoechoic nodule corresponding to the patient's  palpable area of concern in the superior left shoulder which may  represent a rheumatoid nodule.  2. Smaller circumscribed hypoechoic nodule anterior to the  second/third rib may represent a additional rheumatoid nodule versus  reactive lymph node.    ASSESSMENT      1. Seropositive rheumatoid arthritis (H)    2. Rheumatoid factor positive    3. Cyclic citrullinated peptide (CCP) antibody positive    4. Positive MAEGAN (antinuclear antibody)    5. SS-A antibody positive    6. Long-term use of high-risk medication    7. 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 Pernicious anemia              (M05.9) Seropositive rheumatoid arthritis (H)  (primary encounter diagnosis)  (R76.8) Rheumatoid factor positive  (R76.8) Cyclic citrullinated peptide (CCP) antibody positive  Comment: RF 50, CCP-80. X ray hands in 2022 did not show any abnormalities. Currently she is on hydroxychloroquine 200 mg daily. Prescribed SSZ in Aug 2024 but she never took it as she was pregnant and was worried of adding a new med. She has her own ophthalmologist at a private practice group in [place]. She may have a mild case of RA. Left shoulder USG - rheumatoid nodule.  She continues to  have PIP swelling as above. She is hesitant to add new meds such as SSZ or certolizumab at this time. I discussed these are options to consider if her symptoms were to progress.   Plan:   Continue  mg daily.   Follow up in 6 months.  Orders Placed This Encounter   Procedures    Comprehensive metabolic panel    CRP inflammation       (R76.8) Positive MAEGAN (antinuclear antibody)  (R76.8) SS-A antibody positive  Comment: MAEGAN 1:160 dense fine speckled, SSA 52 pos. On HCQ.  Plan: continue HCQ     (Z79.899) Long-term use of high-risk medication  Comment: SSZ    Last eye exam in Jan 2024 - recommendation was to observe. No mention of HCQ toxicity.    Plan: have explained potential side effects of Plaquenil including but not limited to retinal toxicity, need for annual retinal eye exams by an ophthalmologist, skin pigmentation, possible QTc prolongation and cause for cardiac arrhythmias especially with medication interactions, possible GI upset, possible muscle weakness.     (R76.12) Positive QuantiFERON-TB Gold test  Comment: noted, status post treatment with rifabutin which she completed on on May 16 th 2024.  Plan: Continue ID recommendations    Pernicious anemia  Comment : Dxed by GI   Plan : follow up with GI     RTC - 4 months.    GALLO CUELLAR MD    Division of Rheumatic & Autoimmune Diseases  [facility]

## 2024-11-25 NOTE — LETTER
11/25/2024       RE: Halle Alcantara  [address]  [facility] [address]     Dear Colleague,    Thank you for referring your patient, Halle Alcantara, to the [facility]HE[facility] at [facility]. Please see a copy of my visit note below.                     Chief Complaint/Reason for Visit: seropositive RA    HPI:    Halle Alcantara is a 38 year old White female with past medical history listed below. Reports morning stiffness > 1 hour. Has pain of left 3rd digit with swelling. She however, thinks her symptoms are manageable being on HCQ alone.  She was prescribed sulfasalazine in August but she would never took it because she was scared of side effects as she found out that she was pregnant.  She follows up with her OB at [facility].  Continues to be on Plaquenil 200 mg daily.      REVIEW OF SYSTEMS    General - pos for fatigue  HEENT - pos for dry eyes and dry mouth   Cardiovascular - neg for chest pain   Respiratory - neg for cough or sob   Gastrointestinal - denied having bloody diarrhea or difficulty with swallowing, Dx of pernicious anemia  Genitourinary - neg for blood in urine, pain or difficulty with urination   Skin - denied skin rashes, pos for history of vitiligo , neg for malar rash,raynaud's.   Neuro - neg for numbness and tingling, h/o migraine  Hematologic - neg for blood clots  Psych - neg for anxiety or depression   Pregnant? - at 17 weeks, In the past, she had a miscarriage at 12 weeks. Has one child (boy) who is 5 years old now - normal delivery, full term healthy baby. Does not smoke, occasionally drinks alcohol. No use of drugs       No past medical history on file.  No past surgical history on file.  Family History   Problem Relation Age of Onset     Thyroid Disease Mother      Rheumatoid Arthritis Father      Diabetes Type 1 Sister      Thyroid Disease Sister      Social History     Socioeconomic  History     Marital status:    Tobacco Use     Smoking status: Never     Smokeless tobacco: Never   Substance and Sexual Activity     Alcohol use: Not Currently     Drug use: Yes     Sexual activity: Yes     Partners: Male       Allergies   Allergen Reactions     Cephalosporins        Current Outpatient Medications   Medication Sig Dispense Refill     hydroxychloroquine (PLAQUENIL) 200 MG tablet Take 1 tablet (200 mg) by mouth daily. *Due for Annual Plaquenil toxicity eye screening in January 90 tablet 0     levothyroxine (SYNTHROID/LEVOTHROID) 112 MCG tablet Take 1 Tablet by mouth daily.       Prenatal MV-Min-Fe Fum-FA-DHA (PRENATAL MULTIVITAMIN + DHA PO)        No current facility-administered medications for this visit.       PHYSICAL EXAM    BP 99/65 (BP Location: Right arm, Patient Position: Sitting, Cuff Size: Adult Regular)   Pulse 66   Wt 60.1 kg (132 lb 8 oz)   LMP 04/28/2024 (Exact Date)   SpO2 100%   BMI 20.75 kg/m        General: Alert, No apparent distress  Eyes: Sclera noninjected  Skin: No rashes noted  Cardiovascular: Regular rate and rhythm, Normal S1 and S2 and No murmurs, rubs or gallops  Respiratory: Clear to auscultation with no wheezing or crackles  Neuro: Normal gait and Able to arise from seated position unassisted  Musculoskeletal: Hands: Puffiness of right third PIP and left second and third PIP(tender).  Wrists:  Normal.  No synovitis  Elbows:  Normal.  Shoulders:  Normal.  Feet:  Normal.  Ankles:  Normal.  Knees:  Normal.    LABS   Reviewed as below.     Sept 2024  CBC - normal wbc, Hb and plt low at 146k    April 2024  LFT, creatinine normal     Dec 2023  CBC - normal wbc, hb and plt  CRP normal    Oct 2023  Ds DNA, c3, c4,CRP normal  UA neg for blood or protein     March 2023  HCV neg     2022  RF 50  CCP 80  QTB pos   HCV neg   Hep B surface ag and core ab neg   MAEGAN 1:160 dense fine speckled   SSA 52 pos   Sm/RNP, SSA 60, SSB, ds DNA neg   Lupus anticoagulant neg    Cardiolipin Ig A and beta 2 glycoprotein Ig G, A and M neg     Exam: TEMPORARY, 10/12/2023 1:20 PM     Indication: Shoulder round cystic-like mass within tendon/muscle  structure, shoulder pain. History of rheumatoid arthritis, rule out RA  nodule, rule out abnormal lymph node.     Comparison: Chest x-ray 8/4/2023     Technique: Real-time sonographic evaluation of the left shoulder  palpable area. Still and cine images obtained and archived.     Findings:      In the patient's palpable area of concern in the superior left  shoulder there is a circumscribed hypoechoic nodule which measures 0.5  x 0.5 x 0.9 cm. No internal vascularity on color Doppler imaging.      Additional smaller hypoechoic nodule in the anterior left shoulder,  anterior to the second/third rib labeled #2 measures 0.2 x 0.5 x 0.6  cm. No internal vascularity on Doppler imaging.     Multiple additional subcentimeter benign lymph nodes with retained  fatty sariah about the left shoulder.                                                                      Impression:      1. Circumscribed hypoechoic nodule corresponding to the patient's  palpable area of concern in the superior left shoulder which may  represent a rheumatoid nodule.  2. Smaller circumscribed hypoechoic nodule anterior to the  second/third rib may represent a additional rheumatoid nodule versus  reactive lymph node.    ASSESSMENT      1. Seropositive rheumatoid arthritis (H)    2. Rheumatoid factor positive    3. Cyclic citrullinated peptide (CCP) antibody positive    4. Positive MAEGAN (antinuclear antibody)    5. SS-A antibody positive    6. Long-term use of high-risk medication    7. 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 Pernicious anemia              (M05.9) Seropositive rheumatoid arthritis (H)  (primary encounter diagnosis)  (R76.8) Rheumatoid factor positive  (R76.8) Cyclic citrullinated peptide (CCP) antibody positive  Comment: RF 50, CCP-80. X ray hands in 2022 did not show  any abnormalities. Currently she is on hydroxychloroquine 200 mg daily. Prescribed SSZ in Aug 2024 but she never took it as she was pregnant and was worried of adding a new med. She has her own ophthalmologist at a private practice group in [place]. She may have a mild case of RA. Left shoulder USG - rheumatoid nodule.  She continues to have PIP swelling as above. She is hesitant to add new meds such as SSZ or certolizumab at this time. I discussed these are options to consider if her symptoms were to progress.   Plan:   Continue  mg daily.   Follow up in 6 months.  Orders Placed This Encounter   Procedures     Comprehensive metabolic panel     CRP inflammation       (R76.8) Positive MAEGAN (antinuclear antibody)  (R76.8) SS-A antibody positive  Comment: MAEGAN 1:160 dense fine speckled, SSA 52 pos. On HCQ.  Plan: continue HCQ     (Z79.899) Long-term use of high-risk medication  Comment: SSZ    Last eye exam in Jan 2024 - recommendation was to observe. No mention of HCQ toxicity.    Plan: have explained potential side effects of Plaquenil including but not limited to retinal toxicity, need for annual retinal eye exams by an ophthalmologist, skin pigmentation, possible QTc prolongation and cause for cardiac arrhythmias especially with medication interactions, possible GI upset, possible muscle weakness.     (R76.12) Positive QuantiFERON-TB Gold test  Comment: noted, status post treatment with rifabutin which she completed on on May 16 th 2024.  Plan: Continue ID recommendations    Pernicious anemia  Comment : Dxed by GI   Plan : follow up with GI     RTC - 4 months.    LYNN CUELLAR MD    Division of Rheumatic & Autoimmune Diseases  [facility]        Again, thank you for allowing me to participate in the care of your patient.      Sincerely,    Lynn Cuellar MD

## 2024-11-25 NOTE — NURSING NOTE
Chief Complaint   Patient presents with    RECHECK     BP 99/65 (BP Location: Right arm, Patient Position: Sitting, Cuff Size: Adult Regular)   Pulse 66   Wt 60.1 kg (132 lb 8 oz)   LMP 04/28/2024 (Exact Date)   SpO2 100%   BMI 20.75 kg/m

## 2025-01-30 ENCOUNTER — MYC REFILL (OUTPATIENT)
Dept: RHEUMATOLOGY | Facility: CLINIC | Age: 40
End: 2025-01-30
Payer: COMMERCIAL

## 2025-01-30 DIAGNOSIS — M05.79 RHEUMATOID ARTHRITIS INVOLVING MULTIPLE SITES WITH POSITIVE RHEUMATOID FACTOR (H): ICD-10-CM

## 2025-01-31 NOTE — TELEPHONE ENCOUNTER
Last Written Prescription:  hydroxychloroquine (PLAQUENIL) 200 MG tablet 90 tablet 0 10/31/2024     ----------------------  Last Visit Date: 11/25/24  Future Visit Date: 5/27/25  ----------------------  Creatinine   Date Value Ref Range Status   04/14/2024 0.82 0.51 - 0.95 mg/dL Final   Eye exam: 1/2/24      Refill decision: Medication unable to be refilled by RN due to:   Overdue eye exam    Valorie Boucher RN  [facility] Triage & Med Refill Team         Request from pharmacy:  Requested Prescriptions   Pending Prescriptions Disp Refills    hydroxychloroquine (PLAQUENIL) 200 MG tablet 90 tablet 0     Sig: Take 1 tablet (200 mg) by mouth daily. *Due for Annual Plaquenil toxicity eye screening in January       There is no refill protocol information for this order

## 2025-02-06 RX ORDER — HYDROXYCHLOROQUINE SULFATE 200 MG/1
200 TABLET, FILM COATED ORAL DAILY
Qty: 90 TABLET | Refills: 0 | Status: SHIPPED | OUTPATIENT
Start: 2025-02-06

## 2025-02-11 ENCOUNTER — TRANSFERRED RECORDS (OUTPATIENT)
Dept: RHEUMATOLOGY | Facility: CLINIC | Age: 40
End: 2025-02-11
Payer: COMMERCIAL

## 2025-02-14 ENCOUNTER — HOSPITAL ENCOUNTER (OUTPATIENT)
Facility: CLINIC | Age: 40
End: 2025-02-14
Admitting: OBSTETRICS & GYNECOLOGY
Payer: COMMERCIAL

## 2025-02-14 ENCOUNTER — HOSPITAL ENCOUNTER (OUTPATIENT)
Facility: CLINIC | Age: 40
Discharge: HOME | End: 2025-02-14
Attending: OBSTETRICS & GYNECOLOGY | Admitting: OBSTETRICS & GYNECOLOGY
Payer: COMMERCIAL

## 2025-02-14 VITALS
RESPIRATION RATE: 16 BRPM | HEIGHT: 67 IN | WEIGHT: 146 LBS | BODY MASS INDEX: 22.91 KG/M2 | SYSTOLIC BLOOD PRESSURE: 107 MMHG | TEMPERATURE: 98.3 F | DIASTOLIC BLOOD PRESSURE: 61 MMHG | HEART RATE: 63 BPM

## 2025-02-14 PROBLEM — Z36.89 ENCOUNTER FOR TRIAGE IN PREGNANT PATIENT: Status: ACTIVE | Noted: 2025-02-14

## 2025-02-14 PROCEDURE — 59025 FETAL NON-STRESS TEST: CPT

## 2025-02-14 PROCEDURE — G0463 HOSPITAL OUTPT CLINIC VISIT: HCPCS | Mod: 25

## 2025-02-14 RX ORDER — LIDOCAINE 40 MG/G
CREAM TOPICAL
Status: DISCONTINUED | OUTPATIENT
Start: 2025-02-14 | End: 2025-02-15 | Stop reason: HOSPADM

## 2025-02-14 ASSESSMENT — ACTIVITIES OF DAILY LIVING (ADL)
ADLS_ACUITY_SCORE: 18
ADLS_ACUITY_SCORE: 41

## 2025-02-15 NOTE — PROVIDER NOTIFICATION
02/14/25 2216   Provider Notification   Provider Name/Title Dr. Batista   Method of Notification Electronic Page   Notification Reason Patient Arrived;Uterine Activity;Status Update     Dr. Batista responded to page. Reviewed OB history, medical history and reason for visit. Dr. Batista stated that since the pt did not fall directly on her belly extended monitoring is not needed. Reviewed blood type and recent Rhogam injection done on Monday 02/10/2025 at her routine OB appointment. Dr. Batista stated the pt could be discharged home and should follow up with her primary OB next week while taking it easy this weekend.

## 2025-02-15 NOTE — PLAN OF CARE
Data: Patient presented to [facility]: 2025  9:54 PM.  Reason for maternal/fetal assessment is fall/trauma. Patient reports falling while at home around 21:00 and landing on her left side. Patient denies uterine contractions, leaking of vaginal fluid/rupture of membranes, vaginal bleeding, abdominal pain, pelvic pressure, nausea, vomiting, headache, visual disturbances, epigastric or RUQ pain, significant edema. Patient reports fetal movement is normal. Patient is a 29w0d .  Prenatal record reviewed. Pregnancy has been complicated by advanced maternal age (>=34yo) and hypothyroidism and rheumatoid arthritis .    Vital signs wnl. Support person is not present.     Action: Verbal consent for EFM. Triage assessment completed.     Response: Patient verbalized agreement with plan. Will contact Dr. Batista with update and further orders.

## 2025-02-15 NOTE — DISCHARGE INSTRUCTIONS
Learning About When to Call Your Doctor During Pregnancy (After 20 Weeks)  Overview  It's common to have concerns about what might be a problem when you're pregnant. Most pregnancies don't have any serious problems. But it's still important to know when to call your doctor if you have certain symptoms or signs of labor.  These are general suggestions. Your doctor may give you some more information about when to call.  When to call your doctor (after 20 weeks)  Call 911  anytime you think you may need emergency care. For example, call if:  You have severe vaginal bleeding. This means you are soaking through a pad each hour for 2 or more hours.  You have sudden, severe pain in your belly.  You have chest pain, are short of breath, or cough up blood.  You passed out (lost consciousness).  You have a seizure.  You see or feel the umbilical cord.  You think you are about to deliver your baby and can't make it safely to the hospital or birthing center.  Call your doctor now or seek immediate medical care if:  You have vaginal bleeding.  You have belly pain.  You have a fever.  You are dizzy or lightheaded, or you feel like you may faint.  You have signs of a blood clot in your leg (called a deep vein thrombosis), such as:  Pain in the calf, back of the knee, thigh, or groin.  Swelling in your leg or groin.  A color change on the leg or groin. The skin may be reddish or purplish, depending on your usual skin color.  You have symptoms of preeclampsia, such as:  Sudden swelling of your face, hands, or feet.  New vision problems (such as dimness, blurring, or seeing spots).  A severe headache.  You have a sudden release of fluid from your vagina. (You think your water broke.)  You've been having regular contractions for an hour. This means that you've had at least 6 contractions within 1 hour, even after you change your position and drink fluids.  You notice that your baby has stopped moving or is moving less than  "normal.  You have signs of heart failure, such as:  New or increased shortness of breath.  New or worse swelling in your legs, ankles, or feet.  Sudden weight gain, such as more than 2 to 3 pounds in a day or 5 pounds in a week.  Feeling so tired or weak that you cannot do your usual activities.  You have symptoms of a urinary tract infection. These may include:  Pain or burning when you urinate.  A frequent need to urinate without being able to pass much urine.  Pain in the flank, which is just below the rib cage and above the waist on either side of the back.  Blood in your urine.  Watch closely for changes in your health, and be sure to contact your doctor if:  You have vaginal discharge that smells bad.  You feel sad, anxious, or hopeless for more than a few days.  You have skin changes, such as a rash, itching, or a yellow color to your skin.  You have other concerns about your pregnancy.  If you have labor signs at 37 weeks or more  If you have signs of labor at 37 weeks or more, your doctor may tell you to call when your labor becomes more active. Symptoms of active labor include:  Contractions that are regular.  Contractions that are less than 5 minutes apart.  Contractions that are hard to talk through.  Follow-up care is a key part of your treatment and safety. Be sure to make and go to all appointments, and call your doctor if you are having problems. It's also a good idea to know your test results and keep a list of the medicines you take.  Where can you learn more?  Go to https://www.[employer].net/patiented  Enter N531 in the search box to learn more about \"Learning About When to Call Your Doctor During Pregnancy (After 20 Weeks).\"  Current as of: April 30, 2024  Content Version: 14.3    2024 [employer] [employer]   Care instructions adapted under license by your healthcare professional. If you have questions about a medical condition or this instruction, always ask your healthcare professional. " [employer] [facility] disclaims any warranty or liability for your use of this information.

## 2025-02-15 NOTE — PLAN OF CARE
Data: Patient assessed in the [facility] for fall/trauma. Cervical exam deferred. Membranes intact. Contractions are present. Contactions are  , 3-5, pt denies any contractions or cramping minutes apart, and last 60-90 seconds. Uterine assessment is mild by palpation during contractions and soft by palpation at rest. See flowsheets for fetal assessment documentation.     Action: Presumed adequate fetal oxygenation documented. Discharge instructions reviewed. Patient instructed to report change in fetal movement, vaginal leaking of fluid or bleeding, abdominal pain, or any concerns related to the pregnancy to provider/clinic.      Response: Orders to discharge home per Dr. Batista. Patient verbalized understanding of education and agreement with plan. Discharged to home at 23:18.

## 2025-03-17 ENCOUNTER — TELEPHONE (OUTPATIENT)
Dept: RHEUMATOLOGY | Facility: CLINIC | Age: 40
End: 2025-03-17
Payer: COMMERCIAL

## 2025-03-17 NOTE — TELEPHONE ENCOUNTER
Dr. Garcia will be moving her practice to [place] only starting in early April. Pt can schedule with her there or transfer care to another provider-if they want to schedule with a different provider, it is a return 30 minute appt (if the patient is a return to Dr. Garcia). If the patient is totally NEW, then schedule with whomever is next available.      Called pt and LVM, also sent [employer] message

## 2025-06-03 ENCOUNTER — MYC REFILL (OUTPATIENT)
Dept: RHEUMATOLOGY | Facility: CLINIC | Age: 40
End: 2025-06-03
Payer: COMMERCIAL

## 2025-06-03 ENCOUNTER — TELEPHONE (OUTPATIENT)
Dept: RHEUMATOLOGY | Facility: CLINIC | Age: 40
End: 2025-06-03

## 2025-06-03 DIAGNOSIS — M05.79 RHEUMATOID ARTHRITIS INVOLVING MULTIPLE SITES WITH POSITIVE RHEUMATOID FACTOR (H): ICD-10-CM

## 2025-06-03 NOTE — TELEPHONE ENCOUNTER
Health Call Center    Phone Message    May a detailed message be left on voicemail: yes     Reason for Call: Other: Pt is calling to see if she can transfer her care from Dr Garcia to Dr Recio due to location since Dr Garcia is no longer at the [place] location. Pt would like to stay at either [place] location. Please call pt back at ph: [phone].      Action Taken: Message routed to:  Other: [facility]    Travel Screening: Not Applicable     Date of Service: 6/3

## 2025-06-04 RX ORDER — HYDROXYCHLOROQUINE SULFATE 200 MG/1
200 TABLET, FILM COATED ORAL DAILY
Qty: 90 TABLET | Refills: 0 | Status: SHIPPED | OUTPATIENT
Start: 2025-06-04 | End: 2025-09-02

## 2025-06-04 NOTE — TELEPHONE ENCOUNTER
Last Written Prescription:  hydroxychloroquine (PLAQUENIL) 200 MG tablet   90 tablet 0 2/18/2025     ----------------------  Last Visit Date: 11-  Future Visit Date: no future appt or lab appt noted  ----------------------  Creatinine   Date Value Ref Range Status   04/14/2024 0.82 0.51 - 0.95 mg/dL Final     EYE Exam:  2-   [facility]  Refill decision: Medication unable to be refilled by RN due to: Overdue labs/test:    And Care Everywhere reviewed as well. 90 day refill given on  2-    Request from pharmacy:  Requested Prescriptions   Pending Prescriptions Disp Refills    hydroxychloroquine (PLAQUENIL) 200 MG tablet 90 tablet 0     Sig: Take 1 tablet (200 mg) by mouth daily. *Due for Annual Plaquenil toxicity eye screening in January       There is no refill protocol information for this order

## 2025-06-05 NOTE — TELEPHONE ENCOUNTER
"EVER approved by Dr Recio and Dr Garcia.  Per Dr Recio, next available, 30\"    LM for patient to call back.     Sejal Roblero RN    "

## 2025-06-10 ENCOUNTER — TELEPHONE (OUTPATIENT)
Dept: RHEUMATOLOGY | Facility: CLINIC | Age: 40
End: 2025-06-10
Payer: COMMERCIAL

## 2025-06-10 NOTE — TELEPHONE ENCOUNTER
Left Voicemail (1st Attempt) and Sent Mychart (1st Attempt) for the patient to call back and schedule the following:    Appointment type: Return Rheumatology  Provider: Dr. Recio  Return date: Next available  Specialty phone number: [phone]  Additional appointment(s) needed: NA  Additonal Notes: EVER from Dr. Garcia to Dr. Recio

## 2025-06-13 NOTE — TELEPHONE ENCOUNTER
Left Voicemail (2nd Attempt) for the patient to call back and schedule the following:      Appointment type: Return Rheum   Provider: Dr. Recio  Return date: Next available  Specialty phone number: [phone]  Additonal Notes: EVER from Dr. Garcia